# Patient Record
Sex: FEMALE | Race: BLACK OR AFRICAN AMERICAN | NOT HISPANIC OR LATINO
[De-identification: names, ages, dates, MRNs, and addresses within clinical notes are randomized per-mention and may not be internally consistent; named-entity substitution may affect disease eponyms.]

---

## 2017-02-20 ENCOUNTER — TRANSCRIPTION ENCOUNTER (OUTPATIENT)
Age: 42
End: 2017-02-20

## 2017-06-18 ENCOUNTER — RESULT REVIEW (OUTPATIENT)
Age: 42
End: 2017-06-18

## 2018-01-03 ENCOUNTER — TRANSCRIPTION ENCOUNTER (OUTPATIENT)
Age: 43
End: 2018-01-03

## 2018-10-03 ENCOUNTER — EMERGENCY (EMERGENCY)
Facility: HOSPITAL | Age: 43
LOS: 1 days | Discharge: HOME | End: 2018-10-03
Attending: EMERGENCY MEDICINE | Admitting: EMERGENCY MEDICINE
Payer: COMMERCIAL

## 2018-10-03 VITALS
HEART RATE: 87 BPM | OXYGEN SATURATION: 100 % | TEMPERATURE: 98 F | RESPIRATION RATE: 16 BRPM | SYSTOLIC BLOOD PRESSURE: 180 MMHG | DIASTOLIC BLOOD PRESSURE: 115 MMHG

## 2018-10-03 LAB
APPEARANCE UR: SIGNIFICANT CHANGE UP
BACTERIA # UR AUTO: HIGH
BILIRUB UR-MCNC: NEGATIVE — SIGNIFICANT CHANGE UP
BLOOD UR QL VISUAL: HIGH
COLOR SPEC: SIGNIFICANT CHANGE UP
GLUCOSE UR-MCNC: NEGATIVE — SIGNIFICANT CHANGE UP
HYALINE CASTS # UR AUTO: HIGH
KETONES UR-MCNC: NEGATIVE — SIGNIFICANT CHANGE UP
LEUKOCYTE ESTERASE UR-ACNC: SIGNIFICANT CHANGE UP
NITRITE UR-MCNC: POSITIVE — HIGH
PH UR: 6.5 — SIGNIFICANT CHANGE UP (ref 5–8)
PROT UR-MCNC: 20 — SIGNIFICANT CHANGE UP
RBC CASTS # UR COMP ASSIST: HIGH (ref 0–?)
SP GR SPEC: 1.02 — SIGNIFICANT CHANGE UP (ref 1–1.04)
SQUAMOUS # UR AUTO: SIGNIFICANT CHANGE UP
UROBILINOGEN FLD QL: NORMAL — SIGNIFICANT CHANGE UP
WBC UR QL: >50 — HIGH (ref 0–?)

## 2018-10-03 PROCEDURE — 99283 EMERGENCY DEPT VISIT LOW MDM: CPT

## 2018-10-03 RX ORDER — IBUPROFEN 200 MG
800 TABLET ORAL ONCE
Qty: 0 | Refills: 0 | Status: COMPLETED | OUTPATIENT
Start: 2018-10-03 | End: 2018-10-03

## 2018-10-03 RX ORDER — LIDOCAINE 4 G/100G
1 CREAM TOPICAL ONCE
Qty: 0 | Refills: 0 | Status: COMPLETED | OUTPATIENT
Start: 2018-10-03 | End: 2018-10-03

## 2018-10-03 RX ORDER — DIAZEPAM 5 MG
5 TABLET ORAL ONCE
Qty: 0 | Refills: 0 | Status: DISCONTINUED | OUTPATIENT
Start: 2018-10-03 | End: 2018-10-03

## 2018-10-03 RX ORDER — OXYCODONE HYDROCHLORIDE 5 MG/1
1 TABLET ORAL
Qty: 20 | Refills: 0 | OUTPATIENT
Start: 2018-10-03

## 2018-10-03 RX ORDER — DIAZEPAM 5 MG
1 TABLET ORAL
Qty: 12 | Refills: 0 | OUTPATIENT
Start: 2018-10-03

## 2018-10-03 RX ORDER — OXYCODONE AND ACETAMINOPHEN 5; 325 MG/1; MG/1
2 TABLET ORAL ONCE
Qty: 0 | Refills: 0 | Status: DISCONTINUED | OUTPATIENT
Start: 2018-10-03 | End: 2018-10-03

## 2018-10-03 RX ORDER — NITROFURANTOIN MACROCRYSTAL 50 MG
100 CAPSULE ORAL ONCE
Qty: 0 | Refills: 0 | Status: COMPLETED | OUTPATIENT
Start: 2018-10-03 | End: 2018-10-03

## 2018-10-03 RX ORDER — NITROFURANTOIN MACROCRYSTAL 50 MG
1 CAPSULE ORAL
Qty: 14 | Refills: 0 | OUTPATIENT
Start: 2018-10-03 | End: 2018-10-09

## 2018-10-03 RX ADMIN — Medication 100 MILLIGRAM(S): at 20:04

## 2018-10-03 RX ADMIN — Medication 60 MILLIGRAM(S): at 18:50

## 2018-10-03 RX ADMIN — OXYCODONE AND ACETAMINOPHEN 2 TABLET(S): 5; 325 TABLET ORAL at 18:49

## 2018-10-03 RX ADMIN — Medication 800 MILLIGRAM(S): at 18:49

## 2018-10-03 RX ADMIN — LIDOCAINE 1 PATCH: 4 CREAM TOPICAL at 18:49

## 2018-10-03 RX ADMIN — Medication 5 MILLIGRAM(S): at 20:15

## 2018-10-03 NOTE — ED PROVIDER NOTE - MEDICAL DECISION MAKING DETAILS
43 yr old female with known herniated disc coming with worsening LLE pain unrelieve with tramadol.  pt had PT, tried motrin, heat packs and spine md who given steroid injections in past.      worsening sciatica pain without any recent injury or sign of infectious cause.  at this time no concern for epidural abscess hematoma without any recent instrumentation or trauma/constitutional sx, no cocnern for fracture without any acute injury, no concern for cord compression with recent MRi and exam not consistent with cord compression at this time.  will check ua but unlikely cause of pain.

## 2018-10-03 NOTE — ED PROVIDER NOTE - OBJECTIVE STATEMENT
43y F with PMHx HTN, hyperthyroidism, and hernia in L5 and S1 presents c/o left low back pain radiating down left leg x3 days. Also reports or tingling and numbness in legs. Had MRI done 4 weeks ago for chronic persistent back pain. Back pain worsen for 3 days now. No trouble urinating or defecating. Also states having dysuria and urinary urgency. No recent fall. No fever 43y F with PMHx HTN, hyperthyroidism, and hernia in L5 and S1 (from a twisting injury while performing ADL's at home) presents c/o left low back pain radiating down left leg x3 days. Also reports or tingling and numbness in legs. Had MRI done 4 weeks ago for chronic persistent back pain. Back pain worsen for 3 days now without any new exacerbating factors. No trouble urinating or defecating. Also states having dysuria and urinary urgency. No recent fall. No fever

## 2018-10-03 NOTE — ED PROVIDER NOTE - NEUROLOGICAL, MLM
Alert and oriented, no focal deficits, no motor or sensory deficits. + left leg straight leg raise, no saddle anesthesia

## 2018-10-03 NOTE — ED PROVIDER NOTE - PMH
Acne    Herpes Simplex Type 2 Infection    HTN - Hypertension    Hypertension in Pregnancy, Preeclampsia, Delivered    Hypothyroidism

## 2018-10-03 NOTE — ED ADULT TRIAGE NOTE - CHIEF COMPLAINT QUOTE
Pt. c/o left lower back pain radiating down left leg with numbness x 5 days. Worsening today with difficulty sitting due to pain. Took Tramadol at 12:30p with minimal relief. No injury/trauma. PMHx: HTN, hypothyroidism

## 2018-10-03 NOTE — ED PROVIDER NOTE - CONSTITUTIONAL, MLM
normal... Well appearing, well nourished, awake, alert, oriented to person, place, time/situation and in moderate apparent distress from pain.

## 2018-10-05 LAB — SPECIMEN SOURCE: SIGNIFICANT CHANGE UP

## 2018-10-06 LAB
-  AMIKACIN: SIGNIFICANT CHANGE UP
-  AMPICILLIN/SULBACTAM: SIGNIFICANT CHANGE UP
-  AMPICILLIN: SIGNIFICANT CHANGE UP
-  AZTREONAM: SIGNIFICANT CHANGE UP
-  CEFAZOLIN: SIGNIFICANT CHANGE UP
-  CEFEPIME: SIGNIFICANT CHANGE UP
-  CEFOXITIN: SIGNIFICANT CHANGE UP
-  CEFTAZIDIME: SIGNIFICANT CHANGE UP
-  CEFTRIAXONE: SIGNIFICANT CHANGE UP
-  CIPROFLOXACIN: SIGNIFICANT CHANGE UP
-  ERTAPENEM: SIGNIFICANT CHANGE UP
-  GENTAMICIN: SIGNIFICANT CHANGE UP
-  IMIPENEM: SIGNIFICANT CHANGE UP
-  LEVOFLOXACIN: SIGNIFICANT CHANGE UP
-  MEROPENEM: SIGNIFICANT CHANGE UP
-  NITROFURANTOIN: SIGNIFICANT CHANGE UP
-  PIPERACILLIN/TAZOBACTAM: SIGNIFICANT CHANGE UP
-  TIGECYCLINE: SIGNIFICANT CHANGE UP
-  TOBRAMYCIN: SIGNIFICANT CHANGE UP
-  TRIMETHOPRIM/SULFAMETHOXAZOLE: SIGNIFICANT CHANGE UP
BACTERIA UR CULT: SIGNIFICANT CHANGE UP
METHOD TYPE: SIGNIFICANT CHANGE UP
ORGANISM # SPEC MICROSCOPIC CNT: SIGNIFICANT CHANGE UP
ORGANISM # SPEC MICROSCOPIC CNT: SIGNIFICANT CHANGE UP

## 2018-11-02 ENCOUNTER — OUTPATIENT (OUTPATIENT)
Dept: OUTPATIENT SERVICES | Facility: HOSPITAL | Age: 43
LOS: 1 days | End: 2018-11-02
Payer: COMMERCIAL

## 2018-11-02 ENCOUNTER — APPOINTMENT (OUTPATIENT)
Dept: ANESTHESIOLOGY | Facility: CLINIC | Age: 43
End: 2018-11-02

## 2018-11-02 DIAGNOSIS — M54.16 RADICULOPATHY, LUMBAR REGION: ICD-10-CM

## 2018-11-02 PROCEDURE — 64483 NJX AA&/STRD TFRM EPI L/S 1: CPT

## 2018-11-02 PROCEDURE — 64484 NJX AA&/STRD TFRM EPI L/S EA: CPT

## 2018-11-05 DIAGNOSIS — M54.17 RADICULOPATHY, LUMBOSACRAL REGION: ICD-10-CM

## 2018-12-09 ENCOUNTER — TRANSCRIPTION ENCOUNTER (OUTPATIENT)
Age: 43
End: 2018-12-09

## 2019-01-03 ENCOUNTER — APPOINTMENT (OUTPATIENT)
Dept: UROLOGY | Facility: CLINIC | Age: 44
End: 2019-01-03
Payer: COMMERCIAL

## 2019-01-03 VITALS
DIASTOLIC BLOOD PRESSURE: 80 MMHG | BODY MASS INDEX: 31.28 KG/M2 | RESPIRATION RATE: 14 BRPM | HEIGHT: 62 IN | SYSTOLIC BLOOD PRESSURE: 120 MMHG | HEART RATE: 102 BPM | TEMPERATURE: 97.8 F | WEIGHT: 170 LBS | OXYGEN SATURATION: 98 %

## 2019-01-03 PROCEDURE — 99213 OFFICE O/P EST LOW 20 MIN: CPT

## 2019-01-06 ENCOUNTER — FORM ENCOUNTER (OUTPATIENT)
Age: 44
End: 2019-01-06

## 2019-01-07 ENCOUNTER — OUTPATIENT (OUTPATIENT)
Dept: OUTPATIENT SERVICES | Facility: HOSPITAL | Age: 44
LOS: 1 days | End: 2019-01-07
Payer: COMMERCIAL

## 2019-01-07 ENCOUNTER — APPOINTMENT (OUTPATIENT)
Dept: ULTRASOUND IMAGING | Facility: CLINIC | Age: 44
End: 2019-01-07
Payer: COMMERCIAL

## 2019-01-07 DIAGNOSIS — R39.0 EXTRAVASATION OF URINE: ICD-10-CM

## 2019-01-07 DIAGNOSIS — N39.0 URINARY TRACT INFECTION, SITE NOT SPECIFIED: ICD-10-CM

## 2019-01-07 LAB — BACTERIA UR CULT: ABNORMAL

## 2019-01-07 PROCEDURE — 76770 US EXAM ABDO BACK WALL COMP: CPT

## 2019-01-07 PROCEDURE — 76770 US EXAM ABDO BACK WALL COMP: CPT | Mod: 26

## 2019-01-13 LAB — BACTERIA UR CULT: NORMAL

## 2019-01-15 ENCOUNTER — MESSAGE (OUTPATIENT)
Age: 44
End: 2019-01-15

## 2019-02-15 ENCOUNTER — APPOINTMENT (OUTPATIENT)
Dept: ANESTHESIOLOGY | Facility: CLINIC | Age: 44
End: 2019-02-15

## 2019-02-15 ENCOUNTER — OUTPATIENT (OUTPATIENT)
Dept: OUTPATIENT SERVICES | Facility: HOSPITAL | Age: 44
LOS: 1 days | End: 2019-02-15
Payer: COMMERCIAL

## 2019-02-15 DIAGNOSIS — M54.17 RADICULOPATHY, LUMBOSACRAL REGION: ICD-10-CM

## 2019-02-15 DIAGNOSIS — M54.16 RADICULOPATHY, LUMBAR REGION: ICD-10-CM

## 2019-02-15 PROCEDURE — 64483 NJX AA&/STRD TFRM EPI L/S 1: CPT

## 2019-02-15 PROCEDURE — 64484 NJX AA&/STRD TFRM EPI L/S EA: CPT

## 2019-02-20 ENCOUNTER — EMERGENCY (EMERGENCY)
Facility: HOSPITAL | Age: 44
LOS: 1 days | Discharge: HOME | End: 2019-02-20
Attending: EMERGENCY MEDICINE
Payer: COMMERCIAL

## 2019-02-20 VITALS
TEMPERATURE: 98 F | RESPIRATION RATE: 16 BRPM | SYSTOLIC BLOOD PRESSURE: 150 MMHG | WEIGHT: 173.06 LBS | HEART RATE: 75 BPM | DIASTOLIC BLOOD PRESSURE: 83 MMHG | OXYGEN SATURATION: 100 %

## 2019-02-20 VITALS
HEART RATE: 65 BPM | DIASTOLIC BLOOD PRESSURE: 86 MMHG | OXYGEN SATURATION: 100 % | RESPIRATION RATE: 18 BRPM | TEMPERATURE: 99 F | SYSTOLIC BLOOD PRESSURE: 117 MMHG

## 2019-02-20 PROCEDURE — 99283 EMERGENCY DEPT VISIT LOW MDM: CPT

## 2019-02-20 PROCEDURE — 99284 EMERGENCY DEPT VISIT MOD MDM: CPT | Mod: 25

## 2019-02-20 PROCEDURE — 96372 THER/PROPH/DIAG INJ SC/IM: CPT

## 2019-02-20 RX ORDER — OXYCODONE HYDROCHLORIDE 5 MG/1
1 TABLET ORAL
Qty: 12 | Refills: 0 | OUTPATIENT
Start: 2019-02-20 | End: 2019-02-22

## 2019-02-20 RX ORDER — KETOROLAC TROMETHAMINE 30 MG/ML
30 SYRINGE (ML) INJECTION ONCE
Qty: 0 | Refills: 0 | Status: DISCONTINUED | OUTPATIENT
Start: 2019-02-20 | End: 2019-02-20

## 2019-02-20 RX ORDER — OXYCODONE HYDROCHLORIDE 5 MG/1
5 TABLET ORAL ONCE
Qty: 0 | Refills: 0 | Status: DISCONTINUED | OUTPATIENT
Start: 2019-02-20 | End: 2019-02-20

## 2019-02-20 RX ORDER — LIDOCAINE 4 G/100G
1 CREAM TOPICAL ONCE
Qty: 0 | Refills: 0 | Status: COMPLETED | OUTPATIENT
Start: 2019-02-20 | End: 2019-02-20

## 2019-02-20 RX ORDER — KETOROLAC TROMETHAMINE 30 MG/ML
15 SYRINGE (ML) INJECTION ONCE
Qty: 0 | Refills: 0 | Status: DISCONTINUED | OUTPATIENT
Start: 2019-02-20 | End: 2019-02-20

## 2019-02-20 RX ORDER — ACETAMINOPHEN 500 MG
975 TABLET ORAL ONCE
Qty: 0 | Refills: 0 | Status: COMPLETED | OUTPATIENT
Start: 2019-02-20 | End: 2019-02-20

## 2019-02-20 RX ADMIN — LIDOCAINE 1 PATCH: 4 CREAM TOPICAL at 09:48

## 2019-02-20 RX ADMIN — OXYCODONE HYDROCHLORIDE 5 MILLIGRAM(S): 5 TABLET ORAL at 09:48

## 2019-02-20 RX ADMIN — Medication 30 MILLIGRAM(S): at 12:35

## 2019-02-20 RX ADMIN — OXYCODONE HYDROCHLORIDE 5 MILLIGRAM(S): 5 TABLET ORAL at 10:20

## 2019-02-20 RX ADMIN — Medication 975 MILLIGRAM(S): at 09:48

## 2019-02-20 RX ADMIN — Medication 975 MILLIGRAM(S): at 10:20

## 2019-02-20 RX ADMIN — Medication 30 MILLIGRAM(S): at 11:59

## 2019-02-20 NOTE — ED PROVIDER NOTE - CARE PLAN
Principal Discharge DX:	Acute right-sided low back pain with right-sided sciatica  Assessment and plan of treatment:	1. Follow with your PMD within 1-2 days. Additionally please follow up with your pain management doctor in 2-3 days.  2. Rest, no heavy lifting.  Warm compresses to area. Recommend Ortho consult to discuss possible MRI vs Physical Therapy- referral list provided.  Light walking. Take Tylenol 650mg every 4-6 hours as needed for mild pain. Additionally take Motrin 600 mg every 8 hours with food for pain. For severe breakthru pain Take Oxycodone 5mg every 6 hours as needed. CAUTION: Do not drive or consume alcohol while taking oxycodone as may cause drowsiness/dizziness.   3. Apply 1 over the counter Lidoderm (Salonpas) patch to the affected area once a day for additional symptom relief. Keep 1 patch on for up to 12 hours at a time but remove after use, do not use more than 1 patch in a 24 hour period.  4. If you develop any worsening pain, weakness, numbness/tingling, bowel or urinary incontinence, groin numbness, fever/chills or any other concerning symptoms please return to ER immediately.

## 2019-02-20 NOTE — ED PROVIDER NOTE - ATTENDING CONTRIBUTION TO CARE
43f employee PMH HTN,Hypothryodism, lbp sp epidural injection 2/15 with mod improvement. Two days later pt states she "ate something bad' with nausea and vomiting, that has since totally resolved. But episode excerbated rt back pain rad to rt leg, No fever chills, cough,ha incontinence. Abd  pain. PE WDWN female awake alert normocephalic atraumatic chest clear anterior & posterior abd soft +bs no mass guarding. Neruo slr rt at 30 degrees. distal sensation power intact  Jas Carpenter MD, Facep

## 2019-02-20 NOTE — ED PROVIDER NOTE - OBJECTIVE STATEMENT
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 Went to her pain management physician on 2/15 a few days after symptom onset and had epidural injection w/ mild improvement of pain. 2 days later patient had nausea and vomiting after eating "bad food" and while vomiting felt like she was hunched over and felt the same right lower back/buttock pain again which has persisted. Pain is felt mainly R buttock and radiates down posterior/lateral aspect of leg with intermittent numbness to R foot. Has been taking Motrin 800 mg every 4 hrs w/o relief. This AM at the end of the work shift she was standing up from going to the bathroom and felt pain get worse so came to ED. She is ambulatory although endorses pain down right leg. Denies fever/chills, swelling to back, saddle anesthesia, bowel/bladder incontinence, dizziness, weakness, abdominal pain, n/v/d, cp, sob, LOC, direct trauma/fall, rash.

## 2019-02-20 NOTE — ED ADULT NURSE REASSESSMENT NOTE - NS ED NURSE REASSESS COMMENT FT1
Pt reports decrease in pain following tylenol, oxycodone and lidoderm administration. Nahid LAINEZ notified. Nahid LAINEZ states he will reassess.

## 2019-02-20 NOTE — ED PROVIDER NOTE - CLINICAL SUMMARY MEDICAL DECISION MAKING FREE TEXT BOX
Adult female with lbp,htn,hypothyrodism, had exacerbation of pain after nausea and vomiting. No fever and chills.incontinence, rad to rt leg. Pain cw sciaticia, Treated for same and improved. DC for opt follow up.

## 2019-02-20 NOTE — ED ADULT NURSE NOTE - NSIMPLEMENTINTERV_GEN_ALL_ED
Implemented All Fall Risk Interventions:  [place] to call system. Call bell, personal items and telephone within reach. Instruct patient to call for assistance. Room bathroom lighting operational. Non-slip footwear when patient is off stretcher. Physically safe environment: no spills, clutter or unnecessary equipment. Stretcher in lowest position, wheels locked, appropriate side rails in place. Provide visual cue, wrist band, yellow gown, etc. Monitor gait and stability. Monitor for mental status changes and reorient to person, place, and time. Review medications for side effects contributing to fall risk. Reinforce activity limits and safety measures with patient and family.

## 2019-02-20 NOTE — ED PROVIDER NOTE - PROGRESS NOTE DETAILS
Patient felt improvement of pain s/p oxy, tylenol, lido patch from 9/10 to 5/10. Able to sit up on stretcher and lightly bear weight which she was unable to do before. Attempted to walk but still w/ pain radiating down R leg which limits ambulation. D/w attending, will give toradol and reassess. - Nahid Starr PA-C Pt feels much improved s/p toradol. Pain now 3/10, able to ambulate at bedside which was improved from previous. Given significant improvement of pain pt wishes to go home, feels well. Pt without any redflag back pain sxs. no midline back pain. Neurological exam intact. D/w attending who agrees and cleared pt for discharge. Will send home short course of oxycodone for breakthru pain, advise tylenol/motrin for mild pain, lidoderm patch, and strict return precautions including red flag symptoms. Pt is stable for discharge. Has ride home with . - Nahid Starr PA-C Phone Followup, Pt much improved overnight. This am after "walking around pain gradually worsened' Has planned follow up with ortho tomorrow and is scheduleing return visit to pain. management. No fever chills, bowel or bladder changes.  Jas Carpenter MD, Facep

## 2019-02-20 NOTE — ED PROVIDER NOTE - EXTREMITY EXAM
No obvious deformity of UE/LE bilaterally. No bony hip tenderness. Pelvis stable. Full AROM with 5/5 UE/LE bilaterally. Sensation intact all extremities./no deformity, pain or tenderness, no restriction of movement

## 2019-02-20 NOTE — ED PROVIDER NOTE - NS CPE EDP MUSC THORACIC LOC
No deformity. No stepoffs. No rash. No midline thoracic tenderness. No paraspinal tenderness. Full AROM.

## 2019-02-20 NOTE — ED PROVIDER NOTE - NS CPE EDP MUSC LUMBAR LOC
No deformity, bruising, rash. No swelling/bruising/erythema around epidural site. No stepoffs. No midline lumbar ttp. +right sided lumbar paraspinal tenderness and +buttock ttp over piriformis on right side. Limited ROM 2/2 pain

## 2019-02-20 NOTE — ED PROVIDER NOTE - NONTENDER LOCATION
impairments found/therapy frequency/rehab potential/anticipated equipment needs at discharge/anticipated discharge recommendation/predicted duration of therapy intervention
Abdomen soft, nontender/nondistended all quadrants with no rebound, guarding or rigidity noted.

## 2019-02-20 NOTE — ED ADULT NURSE NOTE - OBJECTIVE STATEMENT
43y female coming in from [employer] work c/o back pain. A&Ox3 and VSS. Hx of HTN, hypothyroidism and lumbar/sacral herniated disc. Presents to ED c/o lower back pain. Pt reports she received an epidural on Friday for the back pain. Pt reports recent GI upset causing vomiting. Movement exacerbated back pain. Pt went to work last night and states the pain was unbearable. Pt took aleve at 1am without relief.  Pt now states pain 8/10 at rest, 10/10 upon movement. Pain radiates down right leg and numbness/tingling present in right foot. Denies chest pain, sob, fever/chills. Lungs clear. Abdomen soft, nontender.

## 2019-02-20 NOTE — ED PROVIDER NOTE - NSFOLLOWUPCLINICS_GEN_ALL_ED_FT
[facility]  Orthopedic Surgery  [address] [address]  Phone: [phone]  Fax:   Follow Up Time: 1-3 Days

## 2019-02-20 NOTE — ED PROVIDER NOTE - NSFOLLOWUPINSTRUCTIONS_ED_ALL_ED_FT
1. Follow with your PMD within 1-2 days. Additionally please follow up with your pain management doctor in 2-3 days.  2. Rest, no heavy lifting.  Warm compresses to area. Recommend Ortho consult to discuss possible MRI vs Physical Therapy- referral list provided.  Light walking. Take Tylenol 650mg every 4-6 hours as needed for mild pain. Additionally take Motrin 600 mg every 8 hours with food for pain. For severe breakthru pain Take Oxycodone 5mg every 6 hours as needed. CAUTION: Do not drive or consume alcohol while taking oxycodone as may cause drowsiness/dizziness.   3. Apply 1 over the counter Lidoderm (Salonpas) patch to the affected area once a day for additional symptom relief. Keep 1 patch on for up to 12 hours at a time but remove after use, do not use more than 1 patch in a 24 hour period.  4. If you develop any worsening pain, weakness, numbness/tingling, bowel or urinary incontinence, groin numbness, fever/chills or any other concerning symptoms please return to ER immediately.

## 2019-02-20 NOTE — ED ADULT NURSE NOTE - CAS DISCH TRANSFER METHOD
Problem: Patient Care Overview  Goal: Plan of Care/Patient Progress Review  Outcome: No Change  Baby doing very well. Full assessment and VS WDL. Breastfeeding on cue with excellent latch. Voiding and stooling. Content between feedings.        Private car

## 2019-02-20 NOTE — ED ADULT NURSE REASSESSMENT NOTE - NS ED NURSE REASSESS COMMENT FT1
Pt reports significant improvement in pain. Discharged home to follow up with PMD, pain management and orthopedics. Prescribed oxycodone and instructed to take lidocaine patches and tylenol/motrin for pain. VSS. Ambulate with steady gait.

## 2019-02-22 ENCOUNTER — APPOINTMENT (OUTPATIENT)
Dept: ORTHOPEDIC SURGERY | Facility: CLINIC | Age: 44
End: 2019-02-22
Payer: COMMERCIAL

## 2019-02-22 VITALS
HEIGHT: 62 IN | WEIGHT: 173 LBS | SYSTOLIC BLOOD PRESSURE: 132 MMHG | BODY MASS INDEX: 31.83 KG/M2 | HEART RATE: 91 BPM | DIASTOLIC BLOOD PRESSURE: 83 MMHG

## 2019-02-22 DIAGNOSIS — M54.16 RADICULOPATHY, LUMBAR REGION: ICD-10-CM

## 2019-02-22 PROCEDURE — 99204 OFFICE O/P NEW MOD 45 MIN: CPT

## 2019-02-25 NOTE — REASON FOR VISIT
[Initial Visit] : an initial visit for [Degenerative Joint Disease] : degenerative joint disease [Back Pain] : back pain [Radiculopathy] : radiculopathy [FreeTextEntry2] : Lower back pain with radiation to the right buttock and lower extremity

## 2019-02-25 NOTE — HISTORY OF PRESENT ILLNESS
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 In November she underwent an epidural steroid injection at which point she had left leg pain. Currently she has right buttock and anterolateral thigh pain to the pretibial area that she grades as a 10 and lower back pain she grades as a 9. She has had some numbness in the right foot but denies paresthesias. She has had night pain. The pain is worse with coughing, sneezing and forcing to move her bowels. The symptoms are worse with standing and walking more so than sitting. She had another epidural on February 15. She had a recent GI virus with nausea and vomiting that aggravated the pain.

## 2019-02-25 NOTE — PHYSICAL EXAM
[de-identified] : She is fully alert and oriented with a normal mood and affect. She is in no acute distress. She ambulates with a normal gait including tiptoe and heel walking. There are no cutaneous abnormalities were palpable bony defects of the spine. There is no evidence of shortness of breath or respiratory distress. There is no paravertebral muscle spasm or trochanteric tenderness. There is some mild right-sided sciatic notch tenderness but no the left. Lower extremity neurological examination revealed 1+ symmetrical reflexes with normal motor power and sensation. She leg raising in the sitting position at 90° aggravated the right-sided back and leg pain. There no cutaneous abnormalities of the upper lower extremities. Her knees have full range of motion with normal stability. Vascular exam shows no evidence of dark ostomies and there is no lymphedema. Her upper extremities are normal to inspection and a Alvarez have a full range of motion with normal motor power and stability. [de-identified] : She has had an MRI of the lumbar spine. Neither the report or the films are available for review today. A fluoroscopic image from her epidurals reveals evidence of a narrowed disc space at the L4-5 level.

## 2019-02-25 NOTE — DISCUSSION/SUMMARY
[Medication Risks Reviewed] : Medication risks reviewed [de-identified] : She is to start on diclofenac 75 mg twice a day as nonsteroidal anti-inflammatory and I will see her for followup in 3 weeks. She will call as there are problems with the medication or worsening of her symptoms.I have asked her to return with a copy of her MRI for my review at the time of her next visit.

## 2019-03-04 ENCOUNTER — APPOINTMENT (OUTPATIENT)
Dept: ORTHOPEDIC SURGERY | Facility: CLINIC | Age: 44
End: 2019-03-04
Payer: COMMERCIAL

## 2019-03-04 VITALS
HEART RATE: 84 BPM | DIASTOLIC BLOOD PRESSURE: 87 MMHG | BODY MASS INDEX: 31.83 KG/M2 | SYSTOLIC BLOOD PRESSURE: 136 MMHG | HEIGHT: 62 IN | WEIGHT: 173 LBS

## 2019-03-04 PROCEDURE — 72100 X-RAY EXAM L-S SPINE 2/3 VWS: CPT

## 2019-03-04 PROCEDURE — 99215 OFFICE O/P EST HI 40 MIN: CPT

## 2019-03-04 NOTE — CONSULT LETTER
[Dear  ___] : Dear  [unfilled], [Consult Letter:] : I had the pleasure of evaluating your patient, [unfilled]. [Please see my note below.] : Please see my note below. [Consult Closing:] : Thank you very much for allowing me to participate in the care of this patient.  If you have any questions, please do not hesitate to contact me. [Sincerely,] : Sincerely, [FreeTextEntry3] : Ezekiel Fields MD

## 2019-03-04 NOTE — HISTORY OF PRESENT ILLNESS
[Prolonged Standing] : worsened by prolonged standing [Walking] : worsened by walking [NSAIDs] : relieved by nonsteroidal anti-inflammatory drugs [Opioid Analgesics] : relieved by opioid analgesics [de-identified] : Pt presents with c/o left sided low back pain since 10/2018, treated with MARRY and prednisone taper, this provided improvement in pain. Presents today with c/o right sided low back pain since 11/2018, pain radiates to right buttocks and anterior/lateral aspect of RLE to ankle associated with numbness/tingling in the top of right foot and right ankle, She denies tingling to B/L LE. and RLE weakness.  Pt had LESI X2 on 02/15/2019, this provided some relief in pain for 2 days, but  had an episode of N/V that caused exacerbation of her pain, she is scheduled to have another LESI on 03/12/19. Pt F/u with Dr Montiel. Pt takes Percocet 5mg BID and Voltaren, these provides mild relief in pain, , Pt went [facility] on [facility]/21/[facility] due to severe pain. Pt does not participate with PT at this time [Incontinence] : no incontinence [Urinary Ret.] : no urinary retention [de-identified] :  numbness in the right foot exacerbates pain

## 2019-03-04 NOTE — PHYSICAL EXAM
[ALL] : dorsalis pedis, posterior tibial, femoral, popliteal, and radial 2+ and symmetric bilaterally [Normal] : Oriented to person, place, and time, insight and judgement were intact and the affect was normal [] : Motor: [UE Motor Strength NL] : Motor strength of the bilateral upper extremities is normal [NL] : Motor strength of the left lower extremity was normal [Motor Strength Lower Extremities] : right (weak) [UE/LE] : Sensory: Intact in bilateral upper & lower extremities [1+] : right patella 1+ [2+] : left patella 2+ [Antalgic] : antalgic [Vásquez's Sign] : negative Vásquez's sign [Pronator Drift] : negative pronator drift [SLR] : negative straight leg raise [de-identified] : ROM Lumbar spine: limited painful\par TTP right paraspinals L region. \par NTTP L spine\par Skin intact L spine. No rashes, ulcers, blisters. \par No lymphedema. \par Rapid alternating movements- Intact. \par Full and non-painful ROM RUE, LUE, RLE, and LLE. Skin intact RUE, LUE, RLE, and LLE. No rashes, blisters, ulcers. NTTP RUE, LUE, RLE, and LLE. No evidence of dislocation or subluxation B/L.\par  [de-identified] : 3 views AP and flex/ext of LS Spine from [id number] 03/04/19. Read and dictated by Dr. Ezekiel Fields Board Certified orthopaedic surgeon , DDD L45 \par \par MRI 9/2018 - L45 DDD, Right paracentral disc herniation\par \par \par \par

## 2019-03-04 NOTE — DISCUSSION/SUMMARY
[de-identified] : 44 yo female with L45 DDD/HNP, next appointment consider surgery.  Updated lumbar MRI.

## 2019-03-05 ENCOUNTER — FORM ENCOUNTER (OUTPATIENT)
Age: 44
End: 2019-03-05

## 2019-03-06 ENCOUNTER — OUTPATIENT (OUTPATIENT)
Dept: OUTPATIENT SERVICES | Facility: HOSPITAL | Age: 44
LOS: 1 days | End: 2019-03-06
Payer: COMMERCIAL

## 2019-03-06 ENCOUNTER — APPOINTMENT (OUTPATIENT)
Dept: MRI IMAGING | Facility: CLINIC | Age: 44
End: 2019-03-06
Payer: COMMERCIAL

## 2019-03-06 DIAGNOSIS — M51.36 OTHER INTERVERTEBRAL DISC DEGENERATION, LUMBAR REGION: ICD-10-CM

## 2019-03-06 PROCEDURE — 72148 MRI LUMBAR SPINE W/O DYE: CPT

## 2019-03-06 PROCEDURE — 72148 MRI LUMBAR SPINE W/O DYE: CPT | Mod: 26

## 2019-03-11 ENCOUNTER — OTHER (OUTPATIENT)
Age: 44
End: 2019-03-11

## 2019-03-11 ENCOUNTER — MOBILE ON CALL (OUTPATIENT)
Age: 44
End: 2019-03-11

## 2019-03-12 ENCOUNTER — APPOINTMENT (OUTPATIENT)
Dept: ANESTHESIOLOGY | Facility: CLINIC | Age: 44
End: 2019-03-12

## 2019-03-15 ENCOUNTER — APPOINTMENT (OUTPATIENT)
Dept: ORTHOPEDIC SURGERY | Facility: CLINIC | Age: 44
End: 2019-03-15

## 2019-03-25 ENCOUNTER — APPOINTMENT (OUTPATIENT)
Dept: ORTHOPEDIC SURGERY | Facility: CLINIC | Age: 44
End: 2019-03-25
Payer: COMMERCIAL

## 2019-03-25 PROCEDURE — 99215 OFFICE O/P EST HI 40 MIN: CPT

## 2019-03-25 NOTE — DISCUSSION/SUMMARY
[de-identified] : 42 yo female with L45 DDD/HNP progressively worsening and migrating to L34 level, failed PT, SNAIDS, and MARRY, recommend Right L3-L5 laminectomy and discectomy. \par \par I reviewed all major risks, benefits, and complications associated with lumbar laminectomy and/or microdiscectomy including but not limited to bleeding, infection, CSF leak, neurological injury, chronic pain, reherniation, hematoma worsening of pain, anesthetic risk, chronic pain and disability, need for further surgical intervention, medical complications (GI, , DVT, PE, cardiac, pulmonary, etc) and risk of mortality.\par \par

## 2019-03-25 NOTE — PHYSICAL EXAM
[Antalgic] : antalgic [UE Motor Strength NL] : Motor strength of the bilateral upper extremities is normal [NL] : Motor strength of the left lower extremity was normal [Motor Strength Lower Extremities] : right (weak) [1+] : right patella 1+ [2+] : left patella 2+ [ALL] : dorsalis pedis, posterior tibial, femoral, popliteal, and radial 2+ and symmetric bilaterally [Normal] : Oriented to person, place, and time, insight and judgement were intact and the affect was normal [] : Sensory: [L4-RT] : L4 [L5-RT] : L5 [Vásquez's Sign] : negative Vásquez's sign [Pronator Drift] : negative pronator drift [SLR] : negative straight leg raise [de-identified] : ROM Lumbar spine: limited painful\par TTP right paraspinals L region. \par NTTP L spine\par Skin intact L spine. No rashes, ulcers, blisters. \par No lymphedema. \par \par  [de-identified] : EXAM: MR SPINE LUMBAR \par \par \par PROCEDURE DATE: 03/06/2019 \par \par \par \par INTERPRETATION: \par LUMBOSACRAL SPINE MRI \par \par CLINICAL INFORMATION: Low back and right leg pain. \par TECHNIQUE: Multiplanar, multisequence MR imaging was obtained of the \par lumbosacral spine. \par FINDINGS: \par \par DISC LEVEL EVALUATION: \par \par L1/L2: Normal \par L2/L3: Normal \par L3/L4: Normal \par L4/L5: At the level of the disc space there is a moderate sized central and \par right paracentral disc protrusion. In addition extending superiorly from \par this region there is a large fragment of extruded disc material that extends \par craniad in the right paracentral region to nearly the level of the L3-L4 \par disc space. This causes moderate to severe central canal stenosis and severe \par right paracentral stenosis. At the level of the disc space there is moderate \par to severe central canal stenosis and severe right paracentral stenosis. \par These findings likely result in impingement of the right L4 and L5 nerve \par roots. \par L5/S1: There is a small to moderate central disc protrusion that is slightly \par asymmetric to the left and appears to contact the descending left S1 nerve \par root causing mild central canal stenosis. \par \par SPINAL ALIGNMENT: Normal \par DISTAL CORD AND CONUS: The distal cord is normal in appearance. The conus \par terminates at L1 and is unremarkable. \par SI JOINTS: Normal \par MARROW: Marked fatty endplate changes present at L4-L5. \par PARASPINAL MUSCLE AND SOFT TISSUES: Normal \par INTRAABDOMINAL/INTRAPELVIC SOFT TISSUES: Normal \par \par IMPRESSION: Moderate sized central and right paracentral disc protrusion at \par L4-L5 with a superimposed large fragment of extruded disc material that \par extends craniad from the level of the disc space. These findings result in \par central canal stenosis and severe right paracentral stenosis as detailed \par above with likely impingement of the right L4 and L5 nerve roots. \par \par Small to moderate central disc protrusion at L5-S1 that is asymmetric to the \par left and appears to contact the descending left S1 nerve root. \par \par \par LEYDI LUO M.D., ATTENDING RADIOLOGIST Phone #: [phone] \par This document has been electronically signed. Mar 8 2019 12:14PM \par \par 3 views AP and flex/ext of LS Spine from [id number] 03/04/19. Read and dictated by Dr. Ezekiel Fields Board Certified orthopaedic surgeon , DDD L45 \par \par MRI 9/2018 - L45 DDD, Right paracentral disc herniation\par \par \par \par

## 2019-03-25 NOTE — HISTORY OF PRESENT ILLNESS
[Prolonged Standing] : worsened by prolonged standing [Walking] : worsened by walking [NSAIDs] : relieved by nonsteroidal anti-inflammatory drugs [Opioid Analgesics] : relieved by opioid analgesics [de-identified] : Pt with L45 DDD/HNP, failed PT, MARRY, Presents today for f/u results of lumbar spine MRI. pain radiates to right buttocks and anterior/lateral aspect of RLE to ankle associated with numbness/tingling in the top of right foot and right ankle, She denies tingling to B/L LE. and RLE weakness.  Pt had LESI X2 on 02/15/2019, this provided some relief in pain for 2 days, but  had an episode of N/V that caused exacerbation of her pain. Pt F/u with Dr Montiel. Pt takes Percocet 5mg BID and Voltaren, these provides mild relief in pain, [Incontinence] : no incontinence [Urinary Ret.] : no urinary retention [de-identified] :  numbness in the right foot exacerbates pain

## 2019-04-26 ENCOUNTER — OUTPATIENT (OUTPATIENT)
Dept: OUTPATIENT SERVICES | Facility: HOSPITAL | Age: 44
LOS: 1 days | End: 2019-04-26
Payer: COMMERCIAL

## 2019-04-26 VITALS
TEMPERATURE: 98 F | WEIGHT: 171.96 LBS | HEART RATE: 86 BPM | RESPIRATION RATE: 16 BRPM | OXYGEN SATURATION: 99 % | SYSTOLIC BLOOD PRESSURE: 137 MMHG | HEIGHT: 62 IN | DIASTOLIC BLOOD PRESSURE: 89 MMHG

## 2019-04-26 DIAGNOSIS — Z01.818 ENCOUNTER FOR OTHER PREPROCEDURAL EXAMINATION: ICD-10-CM

## 2019-04-26 DIAGNOSIS — Z29.9 ENCOUNTER FOR PROPHYLACTIC MEASURES, UNSPECIFIED: ICD-10-CM

## 2019-04-26 DIAGNOSIS — M48.07 SPINAL STENOSIS, LUMBOSACRAL REGION: ICD-10-CM

## 2019-04-26 LAB
ANION GAP SERPL CALC-SCNC: 12 MMOL/L — SIGNIFICANT CHANGE UP (ref 5–17)
BUN SERPL-MCNC: 12 MG/DL — SIGNIFICANT CHANGE UP (ref 7–23)
CALCIUM SERPL-MCNC: 9.3 MG/DL — SIGNIFICANT CHANGE UP (ref 8.4–10.5)
CHLORIDE SERPL-SCNC: 104 MMOL/L — SIGNIFICANT CHANGE UP (ref 96–108)
CO2 SERPL-SCNC: 27 MMOL/L — SIGNIFICANT CHANGE UP (ref 22–31)
CREAT SERPL-MCNC: 0.76 MG/DL — SIGNIFICANT CHANGE UP (ref 0.5–1.3)
GLUCOSE SERPL-MCNC: 125 MG/DL — HIGH (ref 70–99)
HCT VFR BLD CALC: 28.6 % — LOW (ref 34.5–45)
HGB BLD-MCNC: 8 G/DL — LOW (ref 11.5–15.5)
MCHC RBC-ENTMCNC: 19.7 PG — LOW (ref 27–34)
MCHC RBC-ENTMCNC: 28 GM/DL — LOW (ref 32–36)
MCV RBC AUTO: 70.3 FL — LOW (ref 80–100)
MRSA PCR RESULT.: SIGNIFICANT CHANGE UP
PLATELET # BLD AUTO: 277 K/UL — SIGNIFICANT CHANGE UP (ref 150–400)
POTASSIUM SERPL-MCNC: 3.2 MMOL/L — LOW (ref 3.5–5.3)
POTASSIUM SERPL-SCNC: 3.2 MMOL/L — LOW (ref 3.5–5.3)
RBC # BLD: 4.07 M/UL — SIGNIFICANT CHANGE UP (ref 3.8–5.2)
RBC # FLD: 18.2 % — HIGH (ref 10.3–14.5)
S AUREUS DNA NOSE QL NAA+PROBE: SIGNIFICANT CHANGE UP
SODIUM SERPL-SCNC: 143 MMOL/L — SIGNIFICANT CHANGE UP (ref 135–145)
WBC # BLD: 6.7 K/UL — SIGNIFICANT CHANGE UP (ref 3.8–10.5)
WBC # FLD AUTO: 6.7 K/UL — SIGNIFICANT CHANGE UP (ref 3.8–10.5)

## 2019-04-26 PROCEDURE — 85027 COMPLETE CBC AUTOMATED: CPT

## 2019-04-26 PROCEDURE — 87641 MR-STAPH DNA AMP PROBE: CPT

## 2019-04-26 PROCEDURE — 87640 STAPH A DNA AMP PROBE: CPT

## 2019-04-26 PROCEDURE — G0463: CPT

## 2019-04-26 PROCEDURE — 80048 BASIC METABOLIC PNL TOTAL CA: CPT

## 2019-04-26 NOTE — H&P PST ADULT - ASSESSMENT
TOMMYI VTE 2.0 SCORE [CLOT updated 2019]    AGE RELATED RISK FACTORS                                                       MOBILITY RELATED FACTORS  [x ] Age 41-60 years                                            (1 Point)                    [ ] Bed rest                                                        (1 Point)  [ ] Age: 61-74 years                                           (2 Points)                  [ ] Plaster cast                                                   (2 Points)  [ ] Age= 75 years                                              (3 Points)                    [ ] Bed bound for more than 72 hours                 (2 Points)    DISEASE RELATED RISK FACTORS                                               GENDER SPECIFIC FACTORS  [ ] Edema in the lower extremities                       (1 Point)              [ ] Pregnancy                                                     (1 Point)  [ ] Varicose veins                                               (1 Point)                     [ ] Post-partum < 6 weeks                                   (1 Point)             [x ] BMI > 25 Kg/m2                                            (1 Point)                     [ ] Hormonal therapy  or oral contraception          (1 Point)                 [ ] Sepsis (in the previous month)                        (1 Point)               [ ] History of pregnancy complications                 (1 point)  [ ] Pneumonia or serious lung disease                                               [ ] Unexplained or recurrent                     (1 Point)           (in the previous month)                               (1 Point)  [ ] Abnormal pulmonary function test                     (1 Point)                 SURGERY RELATED RISK FACTORS  [ ] Acute myocardial infarction                              (1 Point)               [ ]  Section                                             (1 Point)  [ ] Congestive heart failure (in the previous month)  (1 Point)      [ ] Minor surgery                                                  (1 Point)   [ ] Inflammatory bowel disease                             (1 Point)               [ ] Arthroscopic surgery                                        (2 Points)  [ ] Central venous access                                      (2 Points)                [ x] General surgery lasting more than 45 minutes (2 points)  [ ] Malignancy- Present or previous                   (2 Points)                [ ] Elective arthroplasty                                         (5 points)    [ ] Stroke (in the previous month)                          (5 Points)                                                                                                                                                           HEMATOLOGY RELATED FACTORS                                                 TRAUMA RELATED RISK FACTORS  [ ] Prior episodes of VTE                                     (3 Points)                [ ] Fracture of the hip, pelvis, or leg                       (5 Points)  [ ] Positive family history for VTE                         (3 Points)             [ ] Acute spinal cord injury (in the previous month)  (5 Points)  [ ] Prothrombin 19380 A                                     (3 Points)               [ ] Paralysis  (less than 1 month)                             (5 Points)  [ ] Factor V Leiden                                             (3 Points)                  [ ] Multiple Trauma within 1 month                        (5 Points)  [ ] Lupus anticoagulants                                     (3 Points)                                                           [ ] Anticardiolipin antibodies                               (3 Points)                                                       [ ] High homocysteine in the blood                      (3 Points)                                             [ ] Other congenital or acquired thrombophilia      (3 Points)                                                [ ] Heparin induced thrombocytopenia                  (3 Points)                                     Total Score [      4    ]

## 2019-04-26 NOTE — H&P PST ADULT - NSANTHOSAYNRD_GEN_A_CORE
No. ABNER screening performed.  STOP BANG Legend: 0-2 = LOW Risk; 3-4 = INTERMEDIATE Risk; 5-8 = HIGH Risk

## 2019-04-26 NOTE — H&P PST ADULT - NSICDXPROBLEM_GEN_ALL_CORE_FT
PROBLEM DIAGNOSES  Problem: Spinal stenosis, lumbosacral region  Assessment and Plan: Right L3-5 Hemilaminectomy and Discectomy  Pre-op education provided - all questions answered     Problem: Need for prophylactic measure  Assessment and Plan: The Caprini score indicates this patient is at risk for a VTE event (score 3-5).  Most surgical patients in this group would benefit from pharmacologic prophylaxis.  The surgical team will determine the balance between VTE risk and bleeding risk

## 2019-04-26 NOTE — H&P PST ADULT - RS GEN PE MLT RESP DETAILS PC
AUTH RECEIVED FOR [facility], IT IS GOOD THROUGH Friday, 3/16/18. PATIENT TO HAVE SUPERPUBIC CATH PLACED TODAY. AWAIT DISCHARGE ORDERS TO ARRANGE.
Social Work [address] [place] [address] Planning:    Pt presents to the ED secondary to altered mental status and hematuria. Pt is from home. SW met with pt's son, Syd Hair, outside of pt's room. Pt's son reports pt has dementia but today he was hallucinating and seeing and talking to people that weren't there. Pt lives alone in a one floor home. Pt's daughter and son assist pt at home daily. Pt is currently active with [facility] and has HHAs through [employer]. Pt's neighbor is also a HHA that is able to assist as needed. Pt has hx at [facility]. Pt's PCP is Dr Lizzy Smallwood and he uses [facility] on [facility] [address] for his prescriptions. Pt's son reports plan will be for pt to go to [facility] [place]. SW left voice message with referral information for [facility] with [facility]. Assigned SW to follow.       Electronically signed by Sadie Khan on 3/9/2018 at 9:46 AM
clear to auscultation bilaterally/airway patent/breath sounds equal/good air movement/respirations non-labored

## 2019-04-26 NOTE — H&P PST ADULT - HISTORY OF PRESENT ILLNESS
42 y/o female H/O HTN, Hypothyroid, C/O back pain since 10/2018 MRI done and found to have herniated disc, s/p PT and prednisone taper that provided some improvement however she had another episode of severe back pain, radiating to RLE with numbness and tingling 2/2019 with worsening symptoms over past month. Now scheduled for Right L3-5 Hemilaminectomy and Discectomy on 5/10/19. Denies any palpitations, SOB, N/V, fever or chills.

## 2019-04-26 NOTE — H&P PST ADULT - ATTENDING COMMENTS
43 yo female with L45 DDD/HNP progressively worsening and migrating to L34 level, failed PT, SNAIDS, and MARRY, recommend Right L3-L5 laminectomy and discectomy. RLE weakness 3/5    I reviewed all major risks, benefits, and complications associated with lumbar laminectomy and/or microdiscectomy including but not limited to bleeding, infection, CSF leak, neurological injury, chronic pain, reherniation, hematoma worsening of pain, anesthetic risk, chronic pain and disability, need for further surgical intervention, medical complications (GI, , DVT, PE, cardiac, pulmonary, etc) and risk of mortality.

## 2019-04-26 NOTE — H&P PST ADULT - NEUROLOGICAL DETAILS
responds to verbal commands/sensation intact/alert and oriented x 3/responds to pain/no spontaneous movement

## 2019-04-26 NOTE — H&P PST ADULT - MUSCULOSKELETAL
details… detailed exam no joint erythema/no calf tenderness/decreased ROM due to pain/no joint swelling

## 2019-04-26 NOTE — H&P PST ADULT - NSICDXPASTMEDICALHX_GEN_ALL_CORE_FT
PAST MEDICAL HISTORY:  Acne     Herpes Simplex Type 2 Infection     HTN - Hypertension     Hypertension in Pregnancy, Preeclampsia, Delivered     Hypothyroidism

## 2019-05-06 ENCOUNTER — APPOINTMENT (OUTPATIENT)
Dept: ORTHOPEDIC SURGERY | Facility: CLINIC | Age: 44
End: 2019-05-06
Payer: COMMERCIAL

## 2019-05-06 DIAGNOSIS — M48.07 SPINAL STENOSIS, LUMBOSACRAL REGION: ICD-10-CM

## 2019-05-06 PROCEDURE — 99214 OFFICE O/P EST MOD 30 MIN: CPT

## 2019-05-06 NOTE — PHYSICAL EXAM
[Antalgic] : antalgic [UE Motor Strength NL] : Motor strength of the bilateral upper extremities is normal [NL] : Motor strength of the left lower extremity was normal [Motor Strength Lower Extremities] : right (weak) [] : Sensory: [L4-RT] : L4 [L5-RT] : L5 [1+] : right patella 1+ [ALL] : dorsalis pedis, posterior tibial, femoral, popliteal, and radial 2+ and symmetric bilaterally [2+] : left patella 2+ [Normal] : Alert and in no acute distress [Vásquez's Sign] : negative Vásquez's sign [SLR] : negative straight leg raise [Pronator Drift] : negative pronator drift [de-identified] : ROM Lumbar spine: limited painful\par TTP right paraspinals L region. \par NTTP L spine\par Skin intact L spine. No rashes, ulcers, blisters. \par No lymphedema. \par \par  [de-identified] : EXAM: MR SPINE LUMBAR \par \par \par PROCEDURE DATE: 03/06/2019 \par \par \par \par INTERPRETATION: \par LUMBOSACRAL SPINE MRI \par \par CLINICAL INFORMATION: Low back and right leg pain. \par TECHNIQUE: Multiplanar, multisequence MR imaging was obtained of the \par lumbosacral spine. \par FINDINGS: \par \par DISC LEVEL EVALUATION: \par \par L1/L2: Normal \par L2/L3: Normal \par L3/L4: Normal \par L4/L5: At the level of the disc space there is a moderate sized central and \par right paracentral disc protrusion. In addition extending superiorly from \par this region there is a large fragment of extruded disc material that extends \par craniad in the right paracentral region to nearly the level of the L3-L4 \par disc space. This causes moderate to severe central canal stenosis and severe \par right paracentral stenosis. At the level of the disc space there is moderate \par to severe central canal stenosis and severe right paracentral stenosis. \par These findings likely result in impingement of the right L4 and L5 nerve \par roots. \par L5/S1: There is a small to moderate central disc protrusion that is slightly \par asymmetric to the left and appears to contact the descending left S1 nerve \par root causing mild central canal stenosis. \par \par SPINAL ALIGNMENT: Normal \par DISTAL CORD AND CONUS: The distal cord is normal in appearance. The conus \par terminates at L1 and is unremarkable. \par SI JOINTS: Normal \par MARROW: Marked fatty endplate changes present at L4-L5. \par PARASPINAL MUSCLE AND SOFT TISSUES: Normal \par INTRAABDOMINAL/INTRAPELVIC SOFT TISSUES: Normal \par \par IMPRESSION: Moderate sized central and right paracentral disc protrusion at \par L4-L5 with a superimposed large fragment of extruded disc material that \par extends craniad from the level of the disc space. These findings result in \par central canal stenosis and severe right paracentral stenosis as detailed \par above with likely impingement of the right L4 and L5 nerve roots. \par \par Small to moderate central disc protrusion at L5-S1 that is asymmetric to the \par left and appears to contact the descending left S1 nerve root. \par \par \par LEYDI LUO M.D., ATTENDING RADIOLOGIST Phone #: [phone] \par This document has been electronically signed. Mar 8 2019 12:14PM \par \par 3 views AP and flex/ext of LS Spine from [id number] 03/04/19. Read and dictated by Dr. Ezekiel Fields Board Certified orthopaedic surgeon , DDD L45 \par \par MRI 9/2018 - L45 DDD, Right paracentral disc herniation\par \par \par \par

## 2019-05-06 NOTE — DISCUSSION/SUMMARY
[de-identified] : 43 yo female with L45 DDD/HNP progressively worsening and migrating to L34 level, failed PT, SNAIDS, and MARRY, recommend Right L3-L5 laminectomy and discectomy. \par \par I reviewed all major risks, benefits, and complications associated with lumbar laminectomy and/or microdiscectomy including but not limited to bleeding, infection, CSF leak, neurological injury, chronic pain, reherniation, hematoma worsening of pain, anesthetic risk, chronic pain and disability, need for further surgical intervention, medical complications (GI, , DVT, PE, cardiac, pulmonary, etc) and risk of mortality.\par \par

## 2019-05-06 NOTE — HISTORY OF PRESENT ILLNESS
[Prolonged Standing] : worsened by prolonged standing [Walking] : worsened by walking [NSAIDs] : relieved by nonsteroidal anti-inflammatory drugs [Opioid Analgesics] : relieved by opioid analgesics [4] : an average pain level of 4/10 [3] : a minimum pain level of 3/10 [Constant] : ~He/She~ states the symptoms seem to be constant [6] : a maximum pain level of 6/10 [de-identified] : Pt with L45 DDD/HNP, progressively worsening and migrating to L34 level, failed PT, MARRY, Presents today for f/u and to discuss surgical intervention.  Pain radiates to right buttocks and anterior/lateral aspect of RLE to ankle associated with numbness/tingling in the top of right foot and right ankle, She denies tingling to B/L LE. and RLE weakness.  Pt had LESI X2 on 02/15/2019, this provided some relief in pain for 2 days, but  had an episode of N/V that caused exacerbation of her pain. Pt F/u with Dr Montiel. Pt takes Percocet 5mg BID and Voltaren, these provides mild relief in pain, [Incontinence] : no incontinence [Urinary Ret.] : no urinary retention [de-identified] :  numbness in the right foot exacerbates pain

## 2019-05-09 ENCOUNTER — TRANSCRIPTION ENCOUNTER (OUTPATIENT)
Age: 44
End: 2019-05-09

## 2019-05-10 ENCOUNTER — RESULT REVIEW (OUTPATIENT)
Age: 44
End: 2019-05-10

## 2019-05-10 ENCOUNTER — APPOINTMENT (OUTPATIENT)
Dept: ORTHOPEDIC SURGERY | Facility: HOSPITAL | Age: 44
End: 2019-05-10

## 2019-05-10 ENCOUNTER — INPATIENT (INPATIENT)
Facility: HOSPITAL | Age: 44
LOS: 0 days | Discharge: HOME | DRG: 520 | End: 2019-05-10
Attending: ORTHOPAEDIC SURGERY | Admitting: ORTHOPAEDIC SURGERY
Payer: COMMERCIAL

## 2019-05-10 VITALS
HEIGHT: 62 IN | WEIGHT: 171.96 LBS | OXYGEN SATURATION: 100 % | SYSTOLIC BLOOD PRESSURE: 147 MMHG | DIASTOLIC BLOOD PRESSURE: 10 MMHG | HEART RATE: 90 BPM | TEMPERATURE: 99 F | RESPIRATION RATE: 17 BRPM

## 2019-05-10 VITALS — SYSTOLIC BLOOD PRESSURE: 116 MMHG | HEART RATE: 90 BPM | DIASTOLIC BLOOD PRESSURE: 83 MMHG

## 2019-05-10 DIAGNOSIS — Z98.51 TUBAL LIGATION STATUS: Chronic | ICD-10-CM

## 2019-05-10 DIAGNOSIS — M48.07 SPINAL STENOSIS, LUMBOSACRAL REGION: ICD-10-CM

## 2019-05-10 PROCEDURE — 88304 TISSUE EXAM BY PATHOLOGIST: CPT

## 2019-05-10 PROCEDURE — 63056 DECOMPRESS SPINAL CORD LMBR: CPT

## 2019-05-10 PROCEDURE — 72020 X-RAY EXAM OF SPINE 1 VIEW: CPT | Mod: 26

## 2019-05-10 PROCEDURE — 72020 X-RAY EXAM OF SPINE 1 VIEW: CPT

## 2019-05-10 PROCEDURE — 93010 ELECTROCARDIOGRAM REPORT: CPT

## 2019-05-10 PROCEDURE — 93005 ELECTROCARDIOGRAM TRACING: CPT

## 2019-05-10 PROCEDURE — 97161 PT EVAL LOW COMPLEX 20 MIN: CPT

## 2019-05-10 PROCEDURE — 88304 TISSUE EXAM BY PATHOLOGIST: CPT | Mod: 26

## 2019-05-10 PROCEDURE — C1889: CPT

## 2019-05-10 RX ORDER — DEXAMETHASONE 0.5 MG/5ML
5 ELIXIR ORAL EVERY 6 HOURS
Refills: 0 | Status: DISCONTINUED | OUTPATIENT
Start: 2019-05-10 | End: 2019-05-10

## 2019-05-10 RX ORDER — HYDROMORPHONE HYDROCHLORIDE 2 MG/ML
1 INJECTION INTRAMUSCULAR; INTRAVENOUS; SUBCUTANEOUS EVERY 6 HOURS
Refills: 0 | Status: DISCONTINUED | OUTPATIENT
Start: 2019-05-10 | End: 2019-05-10

## 2019-05-10 RX ORDER — SENNA PLUS 8.6 MG/1
2 TABLET ORAL
Qty: 0 | Refills: 0 | DISCHARGE
Start: 2019-05-10

## 2019-05-10 RX ORDER — DEXAMETHASONE 0.5 MG/5ML
1 ELIXIR ORAL
Qty: 4 | Refills: 0
Start: 2019-05-10 | End: 2019-05-10

## 2019-05-10 RX ORDER — DEXAMETHASONE 0.5 MG/5ML
ELIXIR ORAL
Refills: 0 | Status: DISCONTINUED | OUTPATIENT
Start: 2019-05-10 | End: 2019-05-10

## 2019-05-10 RX ORDER — CYCLOBENZAPRINE HYDROCHLORIDE 10 MG/1
10 TABLET, FILM COATED ORAL EVERY 8 HOURS
Refills: 0 | Status: DISCONTINUED | OUTPATIENT
Start: 2019-05-10 | End: 2019-05-10

## 2019-05-10 RX ORDER — LEVOTHYROXINE SODIUM 125 MCG
137 TABLET ORAL DAILY
Refills: 0 | Status: DISCONTINUED | OUTPATIENT
Start: 2019-05-10 | End: 2019-05-10

## 2019-05-10 RX ORDER — DOCUSATE SODIUM 100 MG
1 CAPSULE ORAL
Qty: 0 | Refills: 0 | DISCHARGE
Start: 2019-05-10

## 2019-05-10 RX ORDER — ONDANSETRON 8 MG/1
4 TABLET, FILM COATED ORAL ONCE
Refills: 0 | Status: COMPLETED | OUTPATIENT
Start: 2019-05-10 | End: 2019-05-10

## 2019-05-10 RX ORDER — HALOPERIDOL DECANOATE 100 MG/ML
1 INJECTION INTRAMUSCULAR ONCE
Refills: 0 | Status: DISCONTINUED | OUTPATIENT
Start: 2019-05-10 | End: 2019-05-10

## 2019-05-10 RX ORDER — PANTOPRAZOLE SODIUM 20 MG/1
1 TABLET, DELAYED RELEASE ORAL
Qty: 30 | Refills: 0
Start: 2019-05-10 | End: 2019-06-08

## 2019-05-10 RX ORDER — HYDROMORPHONE HYDROCHLORIDE 2 MG/ML
0.5 INJECTION INTRAMUSCULAR; INTRAVENOUS; SUBCUTANEOUS
Refills: 0 | Status: DISCONTINUED | OUTPATIENT
Start: 2019-05-10 | End: 2019-05-10

## 2019-05-10 RX ORDER — DULOXETINE HYDROCHLORIDE 30 MG/1
60 CAPSULE, DELAYED RELEASE ORAL DAILY
Refills: 0 | Status: DISCONTINUED | OUTPATIENT
Start: 2019-05-10 | End: 2019-05-10

## 2019-05-10 RX ORDER — SODIUM CHLORIDE 9 MG/ML
1000 INJECTION, SOLUTION INTRAVENOUS
Refills: 0 | Status: DISCONTINUED | OUTPATIENT
Start: 2019-05-10 | End: 2019-05-10

## 2019-05-10 RX ORDER — PANTOPRAZOLE SODIUM 20 MG/1
40 TABLET, DELAYED RELEASE ORAL
Refills: 0 | Status: DISCONTINUED | OUTPATIENT
Start: 2019-05-10 | End: 2019-05-10

## 2019-05-10 RX ORDER — DEXAMETHASONE 0.5 MG/5ML
1 ELIXIR ORAL EVERY 6 HOURS
Refills: 0 | Status: CANCELLED | OUTPATIENT
Start: 2019-05-12 | End: 2019-05-10

## 2019-05-10 RX ORDER — KETOROLAC TROMETHAMINE 30 MG/ML
15 SYRINGE (ML) INJECTION ONCE
Refills: 0 | Status: DISCONTINUED | OUTPATIENT
Start: 2019-05-10 | End: 2019-05-10

## 2019-05-10 RX ORDER — DEXAMETHASONE 0.5 MG/5ML
3 ELIXIR ORAL EVERY 6 HOURS
Refills: 0 | Status: DISCONTINUED | OUTPATIENT
Start: 2019-05-11 | End: 2019-05-10

## 2019-05-10 RX ORDER — VALACYCLOVIR 500 MG/1
1000 TABLET, FILM COATED ORAL DAILY
Refills: 0 | Status: DISCONTINUED | OUTPATIENT
Start: 2019-05-10 | End: 2019-05-10

## 2019-05-10 RX ORDER — DOCUSATE SODIUM 100 MG
100 CAPSULE ORAL THREE TIMES A DAY
Refills: 0 | Status: DISCONTINUED | OUTPATIENT
Start: 2019-05-10 | End: 2019-05-10

## 2019-05-10 RX ORDER — HYDROCHLOROTHIAZIDE 25 MG
50 TABLET ORAL DAILY
Refills: 0 | Status: DISCONTINUED | OUTPATIENT
Start: 2019-05-10 | End: 2019-05-10

## 2019-05-10 RX ORDER — CHLORHEXIDINE GLUCONATE 213 G/1000ML
1 SOLUTION TOPICAL ONCE
Refills: 0 | Status: COMPLETED | OUTPATIENT
Start: 2019-05-10 | End: 2019-05-10

## 2019-05-10 RX ORDER — FAMOTIDINE 10 MG/ML
20 INJECTION INTRAVENOUS EVERY 12 HOURS
Refills: 0 | Status: DISCONTINUED | OUTPATIENT
Start: 2019-05-10 | End: 2019-05-10

## 2019-05-10 RX ORDER — ACETAMINOPHEN 500 MG
1000 TABLET ORAL ONCE
Refills: 0 | Status: DISCONTINUED | OUTPATIENT
Start: 2019-05-10 | End: 2019-05-10

## 2019-05-10 RX ORDER — SODIUM CHLORIDE 9 MG/ML
3 INJECTION INTRAMUSCULAR; INTRAVENOUS; SUBCUTANEOUS EVERY 8 HOURS
Refills: 0 | Status: DISCONTINUED | OUTPATIENT
Start: 2019-05-10 | End: 2019-05-10

## 2019-05-10 RX ORDER — CEFAZOLIN SODIUM 1 G
2000 VIAL (EA) INJECTION ONCE
Refills: 0 | Status: DISCONTINUED | OUTPATIENT
Start: 2019-05-10 | End: 2019-05-10

## 2019-05-10 RX ORDER — DIAZEPAM 5 MG
5 TABLET ORAL EVERY 12 HOURS
Refills: 0 | Status: DISCONTINUED | OUTPATIENT
Start: 2019-05-10 | End: 2019-05-10

## 2019-05-10 RX ORDER — LIDOCAINE HCL 20 MG/ML
0.2 VIAL (ML) INJECTION ONCE
Refills: 0 | Status: DISCONTINUED | OUTPATIENT
Start: 2019-05-10 | End: 2019-05-10

## 2019-05-10 RX ORDER — FAMOTIDINE 10 MG/ML
20 INJECTION INTRAVENOUS ONCE
Refills: 0 | Status: COMPLETED | OUTPATIENT
Start: 2019-05-10 | End: 2019-05-10

## 2019-05-10 RX ORDER — CEFAZOLIN SODIUM 1 G
2000 VIAL (EA) INJECTION EVERY 8 HOURS
Refills: 0 | Status: DISCONTINUED | OUTPATIENT
Start: 2019-05-10 | End: 2019-05-10

## 2019-05-10 RX ORDER — ACETAMINOPHEN 500 MG
650 TABLET ORAL EVERY 6 HOURS
Refills: 0 | Status: DISCONTINUED | OUTPATIENT
Start: 2019-05-10 | End: 2019-05-10

## 2019-05-10 RX ORDER — AMLODIPINE BESYLATE 2.5 MG/1
5 TABLET ORAL DAILY
Refills: 0 | Status: DISCONTINUED | OUTPATIENT
Start: 2019-05-10 | End: 2019-05-10

## 2019-05-10 RX ORDER — DEXAMETHASONE 0.5 MG/5ML
4 ELIXIR ORAL EVERY 6 HOURS
Refills: 0 | Status: DISCONTINUED | OUTPATIENT
Start: 2019-05-10 | End: 2019-05-10

## 2019-05-10 RX ORDER — SENNA PLUS 8.6 MG/1
2 TABLET ORAL AT BEDTIME
Refills: 0 | Status: DISCONTINUED | OUTPATIENT
Start: 2019-05-10 | End: 2019-05-10

## 2019-05-10 RX ADMIN — HYDROMORPHONE HYDROCHLORIDE 0.5 MILLIGRAM(S): 2 INJECTION INTRAMUSCULAR; INTRAVENOUS; SUBCUTANEOUS at 12:35

## 2019-05-10 RX ADMIN — ONDANSETRON 4 MILLIGRAM(S): 8 TABLET, FILM COATED ORAL at 14:42

## 2019-05-10 RX ADMIN — FAMOTIDINE 20 MILLIGRAM(S): 10 INJECTION INTRAVENOUS at 11:34

## 2019-05-10 RX ADMIN — SODIUM CHLORIDE 100 MILLILITER(S): 9 INJECTION, SOLUTION INTRAVENOUS at 12:42

## 2019-05-10 RX ADMIN — HYDROMORPHONE HYDROCHLORIDE 0.5 MILLIGRAM(S): 2 INJECTION INTRAMUSCULAR; INTRAVENOUS; SUBCUTANEOUS at 11:30

## 2019-05-10 RX ADMIN — CHLORHEXIDINE GLUCONATE 1 APPLICATION(S): 213 SOLUTION TOPICAL at 06:46

## 2019-05-10 RX ADMIN — Medication 100 MILLIGRAM(S): at 14:46

## 2019-05-10 RX ADMIN — HYDROMORPHONE HYDROCHLORIDE 0.5 MILLIGRAM(S): 2 INJECTION INTRAMUSCULAR; INTRAVENOUS; SUBCUTANEOUS at 10:39

## 2019-05-10 RX ADMIN — HYDROMORPHONE HYDROCHLORIDE 0.5 MILLIGRAM(S): 2 INJECTION INTRAMUSCULAR; INTRAVENOUS; SUBCUTANEOUS at 11:07

## 2019-05-10 NOTE — PHYSICAL THERAPY INITIAL EVALUATION ADULT - PERTINENT HX OF CURRENT PROBLEM, REHAB EVAL
Pt is a 43 y/o female presenting to [facility] on 5/10/19 H/O HTN, Hypothyroid, C/O back pain since 10/2018 MRI done and found to have herniated disc, s/p PT and prednisone taper that provided some improvement however she had another episode of severe back pain, radiating to RLE with numbness and tingling 2/2019 with worsening symptoms over past month. Pt is now s/p L4-5 laminotomy on 5/10.

## 2019-05-10 NOTE — ASU DISCHARGE PLAN (ADULT/PEDIATRIC) - CARE PROVIDER_API CALL
Ezekiel Fields)  Orthopedics  [address]  [place] [address]  Phone: [phone]  Fax: [phone]  Follow Up Time:

## 2019-05-10 NOTE — BRIEF OPERATIVE NOTE - OPERATION/FINDINGS
L4-5 laminotomy  Preop: Herniated disc L4-5  Postop: Herniated disc L4-5 with extruded fragment towards L3

## 2019-05-10 NOTE — PHYSICAL THERAPY INITIAL EVALUATION ADULT - ADDITIONAL COMMENTS
Pt lives in an apt with no steps to negotiate. Pt was Ind with all ADLs and amb without AD. Pt works as an LPN at [employer].

## 2019-05-10 NOTE — ASU DISCHARGE PLAN (ADULT/PEDIATRIC) - NURSING INSTRUCTIONS
Please follow-up with Dr. Fields in 7 days. Call office for appointment. Do not get dressing wet and keep on until appointment. Percocet 1 tab for moderate pain, 2 tabs for severe pain. Flexeril for muscle spasms. Please take dexamethasone steroid tablets for first 3 days: 4 mg the first day every 6 hours, 2 mg the second day every 6 hours, and 1 mg the third day every 6 hours.

## 2019-05-10 NOTE — ASU DISCHARGE PLAN (ADULT/PEDIATRIC) - BATHING
Do not submerge in water/do not get dressing wet Sponge only/Do not submerge in water/do not get dressing wet

## 2019-05-16 ENCOUNTER — APPOINTMENT (OUTPATIENT)
Dept: ORTHOPEDIC SURGERY | Facility: CLINIC | Age: 44
End: 2019-05-16
Payer: COMMERCIAL

## 2019-05-16 PROCEDURE — 99024 POSTOP FOLLOW-UP VISIT: CPT

## 2019-05-20 LAB — SURGICAL PATHOLOGY STUDY: SIGNIFICANT CHANGE UP

## 2019-06-03 ENCOUNTER — TRANSCRIPTION ENCOUNTER (OUTPATIENT)
Age: 44
End: 2019-06-03

## 2019-06-03 ENCOUNTER — APPOINTMENT (OUTPATIENT)
Dept: ORTHOPEDIC SURGERY | Facility: CLINIC | Age: 44
End: 2019-06-03
Payer: COMMERCIAL

## 2019-06-03 PROCEDURE — 99024 POSTOP FOLLOW-UP VISIT: CPT

## 2019-07-01 ENCOUNTER — APPOINTMENT (OUTPATIENT)
Dept: ORTHOPEDIC SURGERY | Facility: CLINIC | Age: 44
End: 2019-07-01
Payer: COMMERCIAL

## 2019-07-01 PROCEDURE — 99024 POSTOP FOLLOW-UP VISIT: CPT

## 2019-07-30 ENCOUNTER — RX RENEWAL (OUTPATIENT)
Age: 44
End: 2019-07-30

## 2019-09-23 ENCOUNTER — APPOINTMENT (OUTPATIENT)
Dept: ORTHOPEDIC SURGERY | Facility: CLINIC | Age: 44
End: 2019-09-23
Payer: COMMERCIAL

## 2019-09-23 PROCEDURE — 99214 OFFICE O/P EST MOD 30 MIN: CPT

## 2019-09-26 ENCOUNTER — RESULT REVIEW (OUTPATIENT)
Age: 44
End: 2019-09-26

## 2020-03-05 ENCOUNTER — APPOINTMENT (OUTPATIENT)
Dept: ORTHOPEDIC SURGERY | Facility: CLINIC | Age: 45
End: 2020-03-05
Payer: COMMERCIAL

## 2020-03-05 PROCEDURE — 99214 OFFICE O/P EST MOD 30 MIN: CPT

## 2020-03-05 PROCEDURE — 72100 X-RAY EXAM L-S SPINE 2/3 VWS: CPT

## 2020-03-18 NOTE — H&P PST ADULT - NSICDXPASTSURGICALHX_GEN_ALL_CORE_FT
PAST SURGICAL HISTORY:  History of  Section PAST SURGICAL HISTORY:  H/O tubal ligation     History of  Section Complex Repair And Dermal Autograft Text: The defect edges were debeveled with a #15 scalpel blade.  The primary defect was closed partially with a complex linear closure.  Given the location of the defect, shape of the defect and the proximity to free margins an dermal autograft was deemed most appropriate to repair the remaining defect.  The graft was trimmed to fit the size of the remaining defect.  The graft was then placed in the primary defect, oriented appropriately, and sutured into place.

## 2020-04-06 ENCOUNTER — APPOINTMENT (OUTPATIENT)
Dept: DISASTER EMERGENCY | Facility: CLINIC | Age: 45
End: 2020-04-06

## 2020-04-10 ENCOUNTER — INPATIENT (INPATIENT)
Facility: HOSPITAL | Age: 45
LOS: 7 days | Discharge: HOME | DRG: 871 | End: 2020-04-18
Attending: INTERNAL MEDICINE | Admitting: HOSPITALIST
Payer: COMMERCIAL

## 2020-04-10 VITALS
HEIGHT: 63 IN | OXYGEN SATURATION: 92 % | TEMPERATURE: 102 F | RESPIRATION RATE: 24 BRPM | SYSTOLIC BLOOD PRESSURE: 163 MMHG | HEART RATE: 109 BPM | WEIGHT: 169.98 LBS | DIASTOLIC BLOOD PRESSURE: 119 MMHG

## 2020-04-10 DIAGNOSIS — E03.9 HYPOTHYROIDISM, UNSPECIFIED: ICD-10-CM

## 2020-04-10 DIAGNOSIS — I10 ESSENTIAL (PRIMARY) HYPERTENSION: ICD-10-CM

## 2020-04-10 DIAGNOSIS — U07.1 COVID-19: ICD-10-CM

## 2020-04-10 DIAGNOSIS — Z98.51 TUBAL LIGATION STATUS: Chronic | ICD-10-CM

## 2020-04-10 DIAGNOSIS — R09.02 HYPOXEMIA: ICD-10-CM

## 2020-04-10 LAB
ALBUMIN SERPL ELPH-MCNC: 3.8 G/DL — SIGNIFICANT CHANGE UP (ref 3.3–5)
ALP SERPL-CCNC: 67 U/L — SIGNIFICANT CHANGE UP (ref 40–120)
ALT FLD-CCNC: 21 U/L — SIGNIFICANT CHANGE UP (ref 10–45)
ANION GAP SERPL CALC-SCNC: 15 MMOL/L — SIGNIFICANT CHANGE UP (ref 5–17)
AST SERPL-CCNC: 36 U/L — SIGNIFICANT CHANGE UP (ref 10–40)
BASOPHILS # BLD AUTO: 0 K/UL — SIGNIFICANT CHANGE UP (ref 0–0.2)
BASOPHILS NFR BLD AUTO: 0 % — SIGNIFICANT CHANGE UP (ref 0–2)
BILIRUB SERPL-MCNC: 0.3 MG/DL — SIGNIFICANT CHANGE UP (ref 0.2–1.2)
BUN SERPL-MCNC: 8 MG/DL — SIGNIFICANT CHANGE UP (ref 7–23)
CALCIUM SERPL-MCNC: 8.9 MG/DL — SIGNIFICANT CHANGE UP (ref 8.4–10.5)
CHLORIDE SERPL-SCNC: 102 MMOL/L — SIGNIFICANT CHANGE UP (ref 96–108)
CK SERPL-CCNC: 266 U/L — HIGH (ref 25–170)
CO2 SERPL-SCNC: 24 MMOL/L — SIGNIFICANT CHANGE UP (ref 22–31)
CREAT SERPL-MCNC: 0.96 MG/DL — SIGNIFICANT CHANGE UP (ref 0.5–1.3)
CRP SERPL-MCNC: 4.36 MG/DL — HIGH (ref 0–0.4)
D DIMER BLD IA.RAPID-MCNC: 203 NG/ML DDU — SIGNIFICANT CHANGE UP
EOSINOPHIL # BLD AUTO: 0 K/UL — SIGNIFICANT CHANGE UP (ref 0–0.5)
EOSINOPHIL NFR BLD AUTO: 0 % — SIGNIFICANT CHANGE UP (ref 0–6)
FERRITIN SERPL-MCNC: 37 NG/ML — SIGNIFICANT CHANGE UP (ref 15–150)
GAS PNL BLDV: SIGNIFICANT CHANGE UP
GLUCOSE SERPL-MCNC: 88 MG/DL — SIGNIFICANT CHANGE UP (ref 70–99)
HCT VFR BLD CALC: 25.8 % — LOW (ref 34.5–45)
HGB BLD-MCNC: 7.2 G/DL — LOW (ref 11.5–15.5)
LDH SERPL L TO P-CCNC: 472 U/L — HIGH (ref 50–242)
LYMPHOCYTES # BLD AUTO: 0.71 K/UL — LOW (ref 1–3.3)
LYMPHOCYTES # BLD AUTO: 12.1 % — LOW (ref 13–44)
MCHC RBC-ENTMCNC: 18.7 PG — LOW (ref 27–34)
MCHC RBC-ENTMCNC: 27.9 GM/DL — LOW (ref 32–36)
MCV RBC AUTO: 67 FL — LOW (ref 80–100)
MONOCYTES # BLD AUTO: 0.15 K/UL — SIGNIFICANT CHANGE UP (ref 0–0.9)
MONOCYTES NFR BLD AUTO: 2.6 % — SIGNIFICANT CHANGE UP (ref 2–14)
NEUTROPHILS # BLD AUTO: 4.97 K/UL — SIGNIFICANT CHANGE UP (ref 1.8–7.4)
NEUTROPHILS NFR BLD AUTO: 83.6 % — HIGH (ref 43–77)
PLATELET # BLD AUTO: 180 K/UL — SIGNIFICANT CHANGE UP (ref 150–400)
POTASSIUM SERPL-MCNC: 3.7 MMOL/L — SIGNIFICANT CHANGE UP (ref 3.5–5.3)
POTASSIUM SERPL-SCNC: 3.7 MMOL/L — SIGNIFICANT CHANGE UP (ref 3.5–5.3)
PROCALCITONIN SERPL-MCNC: 0.17 NG/ML — HIGH (ref 0.02–0.1)
PROT SERPL-MCNC: 7.5 G/DL — SIGNIFICANT CHANGE UP (ref 6–8.3)
RBC # BLD: 3.85 M/UL — SIGNIFICANT CHANGE UP (ref 3.8–5.2)
RBC # FLD: 20.8 % — HIGH (ref 10.3–14.5)
SODIUM SERPL-SCNC: 141 MMOL/L — SIGNIFICANT CHANGE UP (ref 135–145)
WBC # BLD: 5.83 K/UL — SIGNIFICANT CHANGE UP (ref 3.8–10.5)
WBC # FLD AUTO: 5.83 K/UL — SIGNIFICANT CHANGE UP (ref 3.8–10.5)

## 2020-04-10 PROCEDURE — 71045 X-RAY EXAM CHEST 1 VIEW: CPT | Mod: 26

## 2020-04-10 PROCEDURE — 99233 SBSQ HOSP IP/OBS HIGH 50: CPT

## 2020-04-10 PROCEDURE — 99285 EMERGENCY DEPT VISIT HI MDM: CPT

## 2020-04-10 PROCEDURE — 93010 ELECTROCARDIOGRAM REPORT: CPT

## 2020-04-10 RX ORDER — ALBUTEROL 90 UG/1
2 AEROSOL, METERED ORAL EVERY 4 HOURS
Refills: 0 | Status: DISCONTINUED | OUTPATIENT
Start: 2020-04-10 | End: 2020-04-18

## 2020-04-10 RX ORDER — MELOXICAM 15 MG/1
1 TABLET ORAL
Qty: 0 | Refills: 0 | DISCHARGE

## 2020-04-10 RX ORDER — HYDROCHLOROTHIAZIDE 25 MG
50 TABLET ORAL DAILY
Refills: 0 | Status: DISCONTINUED | OUTPATIENT
Start: 2020-04-10 | End: 2020-04-18

## 2020-04-10 RX ORDER — HYDROXYCHLOROQUINE SULFATE 200 MG
800 TABLET ORAL EVERY 24 HOURS
Refills: 0 | Status: COMPLETED | OUTPATIENT
Start: 2020-04-10 | End: 2020-04-10

## 2020-04-10 RX ORDER — ACETAMINOPHEN 500 MG
975 TABLET ORAL ONCE
Refills: 0 | Status: COMPLETED | OUTPATIENT
Start: 2020-04-10 | End: 2020-04-10

## 2020-04-10 RX ORDER — HYDROXYCHLOROQUINE SULFATE 200 MG
400 TABLET ORAL EVERY 24 HOURS
Refills: 0 | Status: COMPLETED | OUTPATIENT
Start: 2020-04-11 | End: 2020-04-14

## 2020-04-10 RX ORDER — AMLODIPINE BESYLATE 2.5 MG/1
1 TABLET ORAL
Qty: 0 | Refills: 0 | DISCHARGE

## 2020-04-10 RX ORDER — AMLODIPINE BESYLATE 2.5 MG/1
5 TABLET ORAL DAILY
Refills: 0 | Status: DISCONTINUED | OUTPATIENT
Start: 2020-04-10 | End: 2020-04-10

## 2020-04-10 RX ORDER — AMLODIPINE BESYLATE 2.5 MG/1
10 TABLET ORAL DAILY
Refills: 0 | Status: DISCONTINUED | OUTPATIENT
Start: 2020-04-10 | End: 2020-04-18

## 2020-04-10 RX ORDER — GUAIFENESIN/DEXTROMETHORPHAN 600MG-30MG
10 TABLET, EXTENDED RELEASE 12 HR ORAL EVERY 4 HOURS
Refills: 0 | Status: DISCONTINUED | OUTPATIENT
Start: 2020-04-10 | End: 2020-04-18

## 2020-04-10 RX ORDER — CYCLOBENZAPRINE HYDROCHLORIDE 10 MG/1
1 TABLET, FILM COATED ORAL
Qty: 21 | Refills: 0

## 2020-04-10 RX ORDER — LEVOTHYROXINE SODIUM 125 MCG
137 TABLET ORAL DAILY
Refills: 0 | Status: DISCONTINUED | OUTPATIENT
Start: 2020-04-10 | End: 2020-04-18

## 2020-04-10 RX ORDER — DULOXETINE HYDROCHLORIDE 30 MG/1
60 CAPSULE, DELAYED RELEASE ORAL DAILY
Refills: 0 | Status: DISCONTINUED | OUTPATIENT
Start: 2020-04-10 | End: 2020-04-18

## 2020-04-10 RX ORDER — HYDROXYCHLOROQUINE SULFATE 200 MG
TABLET ORAL
Refills: 0 | Status: COMPLETED | OUTPATIENT
Start: 2020-04-10 | End: 2020-04-14

## 2020-04-10 RX ORDER — VALACYCLOVIR 500 MG/1
1 TABLET, FILM COATED ORAL
Qty: 0 | Refills: 0 | DISCHARGE

## 2020-04-10 RX ORDER — ENOXAPARIN SODIUM 100 MG/ML
40 INJECTION SUBCUTANEOUS EVERY 12 HOURS
Refills: 0 | Status: DISCONTINUED | OUTPATIENT
Start: 2020-04-10 | End: 2020-04-18

## 2020-04-10 RX ORDER — ACETAMINOPHEN 500 MG
650 TABLET ORAL EVERY 4 HOURS
Refills: 0 | Status: DISCONTINUED | OUTPATIENT
Start: 2020-04-10 | End: 2020-04-18

## 2020-04-10 RX ADMIN — ENOXAPARIN SODIUM 40 MILLIGRAM(S): 100 INJECTION SUBCUTANEOUS at 17:13

## 2020-04-10 RX ADMIN — ALBUTEROL 2 PUFF(S): 90 AEROSOL, METERED ORAL at 21:39

## 2020-04-10 RX ADMIN — Medication 10 MILLILITER(S): at 21:39

## 2020-04-10 RX ADMIN — Medication 975 MILLIGRAM(S): at 10:27

## 2020-04-10 RX ADMIN — AMLODIPINE BESYLATE 10 MILLIGRAM(S): 2.5 TABLET ORAL at 17:13

## 2020-04-10 RX ADMIN — Medication 100 MILLIGRAM(S): at 21:39

## 2020-04-10 RX ADMIN — Medication 800 MILLIGRAM(S): at 17:11

## 2020-04-10 NOTE — ED ADULT TRIAGE NOTE - CHIEF COMPLAINT QUOTE
cough and shortness of breath x 10 days worse for the past two days.  Covid + pain with coughing and inspiration

## 2020-04-10 NOTE — ED ADULT NURSE NOTE - OBJECTIVE STATEMENT
45 y/o female hx hypothyroidism, HTN presents to the ED from home c/o x SOB x 10 days. 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 Fever tmax 102F. Pt tested + for COVID outpatient. Denies n/v, weakness, abd pain, diarrhea/constipation, numbness/tingling, urinary s/s. Pt A&Ox3, in no respiratory distress, no CP, pt tachypneic to 30 RR per min, desaturation s/p ambulation to 87% on RA, placed on 2L O2 NC. PT safety maintained with family at bedside, call bell within reach and bed in the lowest position.

## 2020-04-10 NOTE — ED ADULT NURSE NOTE - CHPI ED NUR SYMPTOMS NEG
no decreased eating/drinking/no abdominal pain/no chills/no diarrhea/no rash/no vomiting/no headache

## 2020-04-10 NOTE — H&P ADULT - NSHPLABSRESULTS_GEN_ALL_CORE
.  LABS:                         7.2    5.83  )-----------( 180      ( 10 Apr 2020 10:48 )             25.8     04-10    141  |  102  |  8   ----------------------------<  88  3.7   |  24  |  0.96    Ca    8.9      10 Apr 2020 10:48    TPro  7.5  /  Alb  3.8  /  TBili  0.3  /  DBili  x   /  AST  36  /  ALT  21  /  AlkPhos  67  04-10              RADIOLOGY, EKG & ADDITIONAL TESTS: Reviewed.   CXR personally reviewed: +bilateral opacities

## 2020-04-10 NOTE — ED PROVIDER NOTE - ATTENDING CONTRIBUTION TO CARE
Agree with above, Jas Correa MD, FACEP  This patient was seen during the time of the COVID-19 pandemic, the care of this patient was in support of the [facility] countermeasures to COVID-19.   Jas Correa MD, FACEP: In this physician's medical judgement based on clinical history and physical exam, patient hypoxic with COVID-19 pneumonia, will require inpatient admission for hypoxia to 87% on exertion, home oxygen prn and ID evaluation.    Based on patient's history and physical exam, as well as the results of today's workup, I feel that patient warrants admission to the hospital for further workup/evaluation and continued management. I discussed the findings of today's workup with the patient and addressed the patient's questions and concerns. The patient was agreeable with admission. Our team spoke with the inpatient receiving team who accepted the patient for admission and subsequently took over the patient's care at the time of admission.

## 2020-04-10 NOTE — H&P ADULT - HISTORY OF PRESENT ILLNESS
44f hx obesity, HTN, anxiety, hypothyroidism presenting with progressive SOB and wheeze x 10 days, was tested positive for covid on 4/6/20. Notes no sick contacts or recent travel. Works as RN at [employer]. +one episodes of nausea and vomiting induced by coughing 3 days ago, +1 episode of watery stool, no further episodes since.     In ED: 102.2, 109, 163/114, 24, 87RA. Given tylenol 975mg po x 1.

## 2020-04-10 NOTE — ED PROVIDER NOTE - CLINICAL SUMMARY MEDICAL DECISION MAKING FREE TEXT BOX
Pt known COVID19 (+) on day 10 of illness p/w worsening SOB and tachypnea, hypoxic 91% at rest and 87% while ambulating, placed on NC 3L and satting 96%, pt tba for o2 supplementation and trending of covid reactive labs, CXR and EKG as well, pt appears more comfortable after O2 administration, no accessory muscle use at this time- [place]

## 2020-04-10 NOTE — H&P ADULT - PROBLEM SELECTOR PLAN 1
with hypoxia to 87% on RA, improved to 97% on 2L NC  - start plaquenil 5 day protocol, qtc < 500  - o2 supplementation as needed- if escalates to 100% NRB, consider solu-medrol 1-1.5mg/kg iv daily in two divided doses x 3-5 days  - avoid HFNC/bilevel- if needs o2 beyond NRB, would prone patient and call MICU consult for intubation  - avoid NRB + NC combo  - emailed ID for consideration in regeneron trial- trial enrollment is closed this weak per ID  - trend q3 day cbc w/diff, CMP, d-dimer, ferritin, CRP, CK, LDH

## 2020-04-10 NOTE — ED PROVIDER NOTE - CARE PLAN
Principal Discharge DX:	Hypoxia Principal Discharge DX:	Hypoxia  Secondary Diagnosis:	Pneumonia due to 2019 novel coronavirus

## 2020-04-10 NOTE — ED PROVIDER NOTE - SEVERE SEPSIS ALERT DETAILS
The patient is not septic based on this physician's clinical assessment and medical judgement.   patient with viral pneumonia and without evidence of bacterial sepsis

## 2020-04-10 NOTE — H&P ADULT - ASSESSMENT
44f hx obestiy, HTN, anxiety, hypothyroidism presenting with sepsis 2/2 covid19 diagnosed via OP swab on 4/6/20

## 2020-04-10 NOTE — H&P ADULT - PROBLEM SELECTOR PLAN 3
Pt with diastolic HTN on presentation  - continue hctz 50mg po daily with hold parameters  - will uptitrate amlodipine to 10mg po daily with hold parameters

## 2020-04-10 NOTE — ED PROVIDER NOTE - OBJECTIVE STATEMENT
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 Pt stating since yesterday she has felt very out of breath even at rest and her chills have been more persistent. Pt reports mild nausea, denies abd pain or vomiting, denies diarrhea.

## 2020-04-11 LAB
ALBUMIN SERPL ELPH-MCNC: 3.6 G/DL — SIGNIFICANT CHANGE UP (ref 3.3–5)
ALP SERPL-CCNC: 68 U/L — SIGNIFICANT CHANGE UP (ref 40–120)
ALT FLD-CCNC: 20 U/L — SIGNIFICANT CHANGE UP (ref 10–45)
ANION GAP SERPL CALC-SCNC: 14 MMOL/L — SIGNIFICANT CHANGE UP (ref 5–17)
AST SERPL-CCNC: 32 U/L — SIGNIFICANT CHANGE UP (ref 10–40)
BILIRUB SERPL-MCNC: 0.3 MG/DL — SIGNIFICANT CHANGE UP (ref 0.2–1.2)
BUN SERPL-MCNC: 8 MG/DL — SIGNIFICANT CHANGE UP (ref 7–23)
CALCIUM SERPL-MCNC: 8.6 MG/DL — SIGNIFICANT CHANGE UP (ref 8.4–10.5)
CHLORIDE SERPL-SCNC: 102 MMOL/L — SIGNIFICANT CHANGE UP (ref 96–108)
CK SERPL-CCNC: 263 U/L — HIGH (ref 25–170)
CO2 SERPL-SCNC: 25 MMOL/L — SIGNIFICANT CHANGE UP (ref 22–31)
CREAT SERPL-MCNC: 0.86 MG/DL — SIGNIFICANT CHANGE UP (ref 0.5–1.3)
CRP SERPL-MCNC: 7.22 MG/DL — HIGH (ref 0–0.4)
D DIMER BLD IA.RAPID-MCNC: 156 NG/ML DDU — SIGNIFICANT CHANGE UP
FERRITIN SERPL-MCNC: 36 NG/ML — SIGNIFICANT CHANGE UP (ref 15–150)
GLUCOSE SERPL-MCNC: 91 MG/DL — SIGNIFICANT CHANGE UP (ref 70–99)
HCT VFR BLD CALC: 26.3 % — LOW (ref 34.5–45)
HGB BLD-MCNC: 7.4 G/DL — LOW (ref 11.5–15.5)
LDH SERPL L TO P-CCNC: 359 U/L — HIGH (ref 50–242)
MCHC RBC-ENTMCNC: 18.9 PG — LOW (ref 27–34)
MCHC RBC-ENTMCNC: 28.1 GM/DL — LOW (ref 32–36)
MCV RBC AUTO: 67.3 FL — LOW (ref 80–100)
NRBC # BLD: 0 /100 WBCS — SIGNIFICANT CHANGE UP (ref 0–0)
PLATELET # BLD AUTO: 209 K/UL — SIGNIFICANT CHANGE UP (ref 150–400)
POTASSIUM SERPL-MCNC: 3 MMOL/L — LOW (ref 3.5–5.3)
POTASSIUM SERPL-SCNC: 3 MMOL/L — LOW (ref 3.5–5.3)
PROT SERPL-MCNC: 7 G/DL — SIGNIFICANT CHANGE UP (ref 6–8.3)
RBC # BLD: 3.91 M/UL — SIGNIFICANT CHANGE UP (ref 3.8–5.2)
RBC # FLD: 20.8 % — HIGH (ref 10.3–14.5)
SODIUM SERPL-SCNC: 141 MMOL/L — SIGNIFICANT CHANGE UP (ref 135–145)
WBC # BLD: 5.52 K/UL — SIGNIFICANT CHANGE UP (ref 3.8–10.5)
WBC # FLD AUTO: 5.52 K/UL — SIGNIFICANT CHANGE UP (ref 3.8–10.5)

## 2020-04-11 RX ORDER — SODIUM CHLORIDE 0.65 %
1 AEROSOL, SPRAY (ML) NASAL EVERY 4 HOURS
Refills: 0 | Status: DISCONTINUED | OUTPATIENT
Start: 2020-04-11 | End: 2020-04-18

## 2020-04-11 RX ORDER — ONDANSETRON 8 MG/1
4 TABLET, FILM COATED ORAL EVERY 6 HOURS
Refills: 0 | Status: DISCONTINUED | OUTPATIENT
Start: 2020-04-11 | End: 2020-04-18

## 2020-04-11 RX ORDER — POTASSIUM CHLORIDE 20 MEQ
40 PACKET (EA) ORAL EVERY 4 HOURS
Refills: 0 | Status: COMPLETED | OUTPATIENT
Start: 2020-04-11 | End: 2020-04-11

## 2020-04-11 RX ADMIN — Medication 400 MILLIGRAM(S): at 17:51

## 2020-04-11 RX ADMIN — ALBUTEROL 2 PUFF(S): 90 AEROSOL, METERED ORAL at 14:25

## 2020-04-11 RX ADMIN — Medication 40 MILLIEQUIVALENT(S): at 17:53

## 2020-04-11 RX ADMIN — Medication 1 TABLET(S): at 12:00

## 2020-04-11 RX ADMIN — Medication 650 MILLIGRAM(S): at 12:03

## 2020-04-11 RX ADMIN — DULOXETINE HYDROCHLORIDE 60 MILLIGRAM(S): 30 CAPSULE, DELAYED RELEASE ORAL at 12:00

## 2020-04-11 RX ADMIN — ONDANSETRON 4 MILLIGRAM(S): 8 TABLET, FILM COATED ORAL at 06:23

## 2020-04-11 RX ADMIN — Medication 137 MICROGRAM(S): at 06:22

## 2020-04-11 RX ADMIN — ENOXAPARIN SODIUM 40 MILLIGRAM(S): 100 INJECTION SUBCUTANEOUS at 06:23

## 2020-04-11 RX ADMIN — ONDANSETRON 4 MILLIGRAM(S): 8 TABLET, FILM COATED ORAL at 20:42

## 2020-04-11 RX ADMIN — Medication 650 MILLIGRAM(S): at 03:25

## 2020-04-11 RX ADMIN — Medication 10 MILLILITER(S): at 20:42

## 2020-04-11 RX ADMIN — AMLODIPINE BESYLATE 10 MILLIGRAM(S): 2.5 TABLET ORAL at 06:25

## 2020-04-11 RX ADMIN — Medication 100 MILLIGRAM(S): at 06:22

## 2020-04-11 RX ADMIN — ENOXAPARIN SODIUM 40 MILLIGRAM(S): 100 INJECTION SUBCUTANEOUS at 17:51

## 2020-04-11 RX ADMIN — Medication 40 MILLIEQUIVALENT(S): at 16:12

## 2020-04-11 RX ADMIN — Medication 50 MILLIGRAM(S): at 06:22

## 2020-04-11 NOTE — PROGRESS NOTE ADULT - SUBJECTIVE AND OBJECTIVE BOX
44f hx obesity, HTN, anxiety, hypothyroidism presenting with progressive SOB and wheeze x 10 days, was tested positive for covid on 20. Notes no sick contacts or recent travel. 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 +one episodes of nausea and vomiting induced by coughing 3 days ago, +1 episode of watery stool, no further episodes since.     In ED: 102.2, 109, 163/114, 24, 87RA. Given tylenol 975mg po x 1. (10 Apr 2020 16:43)      PAST MEDICAL & SURGICAL HISTORY:  Hypertension in Pregnancy, Preeclampsia, Delivered  Herpes Simplex Type 2 Infection  Acne  Hypothyroidism  HTN - Hypertension  H/O tubal ligation  History of  Section      Review of Systems:   CONSTITUTIONAL: fever,  fatigue  EYES: No eye pain, visual disturbances, or discharge  ENMT:  No difficulty hearing, tinnitus, vertigo; No sinus or throat pain  NECK: No pain or stiffness  BREASTS: No pain, masses, or nipple discharge  RESPIRATORY: cough,  shortness of breath  CARDIOVASCULAR: No chest pain, palpitations, dizziness, or leg swelling  GASTROINTESTINAL: No abdominal or epigastric pain. No nausea, vomiting, or hematemesis; had  diarrhea no constipation. No melena or hematochezia.  GENITOURINARY: No dysuria, frequency, hematuria, or incontinence  NEUROLOGICAL: No headaches, memory loss, loss of strength, numbness, or tremors  SKIN: No itching, burning, rashes, or lesions   LYMPH NODES: No enlarged glands  ENDOCRINE: No heat or cold intolerance; No hair loss  MUSCULOSKELETAL: No joint pain or swelling; No muscle, back, or extremity pain  PSYCHIATRIC: No depression, anxiety, mood swings, or difficulty sleeping  HEME/LYMPH: No easy bruising, or bleeding gums  ALLERY AND IMMUNOLOGIC: No hives or eczema    Allergies    No Known Allergies    Intolerances        Social History:     FAMILY HISTORY:      MEDICATIONS  (STANDING):  amLODIPine   Tablet 10 milliGRAM(s) Oral daily  DULoxetine 60 milliGRAM(s) Oral daily  enoxaparin Injectable 40 milliGRAM(s) SubCutaneous every 12 hours  hydrochlorothiazide 50 milliGRAM(s) Oral daily  hydroxychloroquine   Oral   hydroxychloroquine 400 milliGRAM(s) Oral every 24 hours  levothyroxine 137 MICROGram(s) Oral daily  Nephro-rimma 1 Tablet(s) Oral daily  potassium chloride    Tablet ER 40 milliEquivalent(s) Oral every 4 hours    MEDICATIONS  (PRN):  acetaminophen   Tablet .. 650 milliGRAM(s) Oral every 4 hours PRN Temp greater or equal to 38.5C (101.3F)  ALBUTerol    90 MICROgram(s) HFA Inhaler 2 Puff(s) Inhalation every 4 hours PRN Shortness of Breath and/or Wheezing  benzonatate 100 milliGRAM(s) Oral three times a day PRN Cough  guaifenesin/dextromethorphan  Syrup 10 milliLiter(s) Oral every 4 hours PRN Cough  ondansetron Injectable 4 milliGRAM(s) IV Push every 6 hours PRN Nausea and/or Vomiting  sodium chloride 0.65% Nasal 1 Spray(s) Both Nostrils every 4 hours PRN Nasal Congestion      Vital Signs Last 24 Hrs  T(C): 36.6 (2020 14:21), Max: 39 (10 Apr 2020 16:43)  T(F): 97.8 (2020 14:21), Max: 102.2 (10 Apr 2020 16:43)  HR: 102 (2020 14:21) (97 - 107)  BP: 132/85 (2020 14:21) (123/81 - 138/105)  BP(mean): 116 (10 Apr 2020 16:43) (116 - 116)  RR: 22 (2020 14:21) (22 - 30)  SpO2: 92% (2020 14:21) (90% - 97%)  CAPILLARY BLOOD GLUCOSE        I&O's Summary        LABS:                        7.4    5.52  )-----------( 209      ( 2020 06:56 )             26.3     04-11    141  |  102  |  8   ----------------------------<  91  3.0<L>   |  25  |  0.86    Ca    8.6      2020 06:56    TPro  7.0  /  Alb  3.6  /  TBili  0.3  /  DBili  x   /  AST  32  /  ALT  20  /  AlkPhos  68  04-11      CARDIAC MARKERS ( 2020 06:56 )  x     / x     / 263 U/L / x     / x      CARDIAC MARKERS ( 10 Apr 2020 10:48 )  x     / x     / 266 U/L / x     / x              RADIOLOGY & ADDITIONAL TESTS:    Imaging Personally Reviewed:    Consultant(s) Notes Reviewed:      Care Discussed with Consultants/Other Providers:

## 2020-04-11 NOTE — PROGRESS NOTE ADULT - ASSESSMENT
44f hx obestiy, HTN, anxiety, hypothyroidism presenting with sepsis 2/2 covid19 diagnosed via OP swab on 4/6/20     Problem/Plan - 1:  ·  Problem: Pneumonia due to 2019 novel coronavirus.  Plan: with hypoxia to 87% on RA, improved to 97% on 2L NC  - start plaquenil 5 day protocol, qtc < 500  - o2 supplementation as needed- if escalates to 100% NRB, consider solu-medrol 1-1.5mg/kg iv daily in two divided doses x 3-5 days  - avoid HFNC/bilevel- if needs o2 beyond NRB, would prone patient and call MICU consult for intubation  - ID consult  for consideration in regeneron trial-   - trend q3 day cbc w/diff, CMP, d-dimer, ferritin, CRP, CK, LDH.      Problem/Plan - 2:  ·  Problem: Hypothyroidism.  Plan: continue synthroid.      Problem/Plan - 3:  ·  Problem: Hypertension.  Plan: Pt with diastolic HTN on presentation  - continue hctz 50mg po daily with hold parameters  - will uptitrate amlodipine to 10mg po daily with hold parameters.

## 2020-04-12 LAB
ANION GAP SERPL CALC-SCNC: 9 MMOL/L — SIGNIFICANT CHANGE UP (ref 5–17)
BUN SERPL-MCNC: 12 MG/DL — SIGNIFICANT CHANGE UP (ref 7–23)
CALCIUM SERPL-MCNC: 9.1 MG/DL — SIGNIFICANT CHANGE UP (ref 8.4–10.5)
CHLORIDE SERPL-SCNC: 101 MMOL/L — SIGNIFICANT CHANGE UP (ref 96–108)
CO2 SERPL-SCNC: 29 MMOL/L — SIGNIFICANT CHANGE UP (ref 22–31)
CREAT SERPL-MCNC: 1.08 MG/DL — SIGNIFICANT CHANGE UP (ref 0.5–1.3)
GLUCOSE SERPL-MCNC: 95 MG/DL — SIGNIFICANT CHANGE UP (ref 70–99)
MAGNESIUM SERPL-MCNC: 2.4 MG/DL — SIGNIFICANT CHANGE UP (ref 1.6–2.6)
POTASSIUM SERPL-MCNC: 3.8 MMOL/L — SIGNIFICANT CHANGE UP (ref 3.5–5.3)
POTASSIUM SERPL-SCNC: 3.8 MMOL/L — SIGNIFICANT CHANGE UP (ref 3.5–5.3)
SODIUM SERPL-SCNC: 139 MMOL/L — SIGNIFICANT CHANGE UP (ref 135–145)

## 2020-04-12 RX ADMIN — ENOXAPARIN SODIUM 40 MILLIGRAM(S): 100 INJECTION SUBCUTANEOUS at 04:11

## 2020-04-12 RX ADMIN — Medication 10 MILLILITER(S): at 22:07

## 2020-04-12 RX ADMIN — Medication 1 SPRAY(S): at 04:11

## 2020-04-12 RX ADMIN — Medication 40 MILLIGRAM(S): at 04:51

## 2020-04-12 RX ADMIN — Medication 10 MILLILITER(S): at 04:10

## 2020-04-12 RX ADMIN — Medication 137 MICROGRAM(S): at 04:10

## 2020-04-12 RX ADMIN — Medication 1 TABLET(S): at 13:34

## 2020-04-12 RX ADMIN — Medication 50 MILLIGRAM(S): at 04:10

## 2020-04-12 RX ADMIN — DULOXETINE HYDROCHLORIDE 60 MILLIGRAM(S): 30 CAPSULE, DELAYED RELEASE ORAL at 13:34

## 2020-04-12 RX ADMIN — Medication 400 MILLIGRAM(S): at 17:41

## 2020-04-12 RX ADMIN — ALBUTEROL 2 PUFF(S): 90 AEROSOL, METERED ORAL at 04:11

## 2020-04-12 RX ADMIN — AMLODIPINE BESYLATE 10 MILLIGRAM(S): 2.5 TABLET ORAL at 04:10

## 2020-04-12 RX ADMIN — ENOXAPARIN SODIUM 40 MILLIGRAM(S): 100 INJECTION SUBCUTANEOUS at 17:41

## 2020-04-12 NOTE — PROGRESS NOTE ADULT - SUBJECTIVE AND OBJECTIVE BOX
Patient is a 44y old  Female who presents with a chief complaint of shortness of breath (11 Apr 2020 16:23)      SUBJECTIVE / OVERNIGHT EVENTS:  mild sob.  on NRB mask. saturation above 91%    MEDICATIONS  (STANDING):  amLODIPine   Tablet 10 milliGRAM(s) Oral daily  DULoxetine 60 milliGRAM(s) Oral daily  enoxaparin Injectable 40 milliGRAM(s) SubCutaneous every 12 hours  hydrochlorothiazide 50 milliGRAM(s) Oral daily  hydroxychloroquine   Oral   hydroxychloroquine 400 milliGRAM(s) Oral every 24 hours  levothyroxine 137 MICROGram(s) Oral daily  Nephro-rimma 1 Tablet(s) Oral daily    MEDICATIONS  (PRN):  acetaminophen   Tablet .. 650 milliGRAM(s) Oral every 4 hours PRN Temp greater or equal to 38.5C (101.3F)  ALBUTerol    90 MICROgram(s) HFA Inhaler 2 Puff(s) Inhalation every 4 hours PRN Shortness of Breath and/or Wheezing  benzonatate 100 milliGRAM(s) Oral three times a day PRN Cough  guaifenesin/dextromethorphan  Syrup 10 milliLiter(s) Oral every 4 hours PRN Cough  ondansetron Injectable 4 milliGRAM(s) IV Push every 6 hours PRN Nausea and/or Vomiting  sodium chloride 0.65% Nasal 1 Spray(s) Both Nostrils every 4 hours PRN Nasal Congestion      Vital Signs Last 24 Hrs  T(C): 36.6 (12 Apr 2020 12:09), Max: 37.2 (11 Apr 2020 20:48)  T(F): 97.8 (12 Apr 2020 12:09), Max: 99 (11 Apr 2020 20:48)  HR: 105 (12 Apr 2020 12:09) (92 - 105)  BP: 114/76 (12 Apr 2020 12:09) (111/80 - 132/85)  BP(mean): --  RR: 22 (12 Apr 2020 12:09) (22 - 22)  SpO2: 91% (12 Apr 2020 12:09) (91% - 92%)  CAPILLARY BLOOD GLUCOSE        I&O's Summary        LABS:                        7.4    5.52  )-----------( 209      ( 11 Apr 2020 06:56 )             26.3     04-12    139  |  101  |  12  ----------------------------<  95  3.8   |  29  |  1.08    Ca    9.1      12 Apr 2020 07:41  Mg     2.4     04-12    TPro  7.0  /  Alb  3.6  /  TBili  0.3  /  DBili  x   /  AST  32  /  ALT  20  /  AlkPhos  68  04-11      CARDIAC MARKERS ( 11 Apr 2020 06:56 )  x     / x     / 263 U/L / x     / x              RADIOLOGY & ADDITIONAL TESTS:    Imaging Personally Reviewed:    Consultant(s) Notes Reviewed:      Care Discussed with Consultants/Other Providers:

## 2020-04-12 NOTE — PROGRESS NOTE ADULT - ASSESSMENT
44f hx obestiy, HTN, anxiety, hypothyroidism presenting with sepsis 2/2 covid19 diagnosed via OP swab on 4/6/20     Problem/Plan - 1:  ·  Problem: Pneumonia due to 2019 novel coronavirus.  Plan: with hypoxia to 87% on RA, improved with supplemental O2  - plaquenil 5 day protocol, qtc < 500  - o2 supplementation as needed-  consider solu-medrol 1-1.5mg/kg iv daily in two divided doses x 3-5 days  - avoid HFNC/bilevel- if needs o2 beyond NRB, would prone patient and call MICU consult for intubation  - per ID consult no other meds at this time  - trend q3 day cbc w/diff, CMP, d-dimer, ferritin, CRP, CK, LDH.      Problem/Plan - 2:  ·  Problem: Hypothyroidism.  Plan: continue synthroid.      Problem/Plan - 3:  ·  Problem: Hypertension.  Plan: Pt with diastolic HTN on presentation  - continue hctz 50mg po daily with hold parameters  - will uptitrate amlodipine to 10mg po daily with hold parameters.

## 2020-04-12 NOTE — CHART NOTE - NSCHARTNOTEFT_GEN_A_CORE
TERI FAIRCHILD    Notified by RN patient O2 sat drop <90% on O2 8L via NC.       Interventions taken   Albuterol inh prn  Solumedrol 40mg iv x1  Increase O2 15L via NRM to keep O2 sat >92%  Prone positioning  cont assess and monitor  f/u with am team.                        Melania Browning ANP-BC  Spectralink #[id number]

## 2020-04-12 NOTE — CHART NOTE - NSCHARTNOTEFT_GEN_A_CORE
Spoke to house ID in regards to this Pt, given that oxygen demand increased from NC 8L to NRB mask 12L. Pt currently on Plaquenil with taper to be finished on 4/14/20. Per ID: c/w Plaquenil for now, at this time no need for anakinra given that inflammatory markers are either low or wnl. If oxygen demand increases overnight Pt can be placed on Solumedrol. Repeat inflammatory markers in AM and monitor clinical course.     Dr. Shaffer made aware of recs    Akiko Lorenzo PA-C

## 2020-04-13 LAB
CRP SERPL-MCNC: 4.07 MG/DL — HIGH (ref 0–0.4)
D DIMER BLD IA.RAPID-MCNC: <150 NG/ML DDU — SIGNIFICANT CHANGE UP
FERRITIN SERPL-MCNC: 41 NG/ML — SIGNIFICANT CHANGE UP (ref 15–150)
HCT VFR BLD CALC: 26.5 % — LOW (ref 34.5–45)
HGB BLD-MCNC: 7.6 G/DL — LOW (ref 11.5–15.5)
LDH SERPL L TO P-CCNC: 341 U/L — HIGH (ref 50–242)
MCHC RBC-ENTMCNC: 19 PG — LOW (ref 27–34)
MCHC RBC-ENTMCNC: 28.7 GM/DL — LOW (ref 32–36)
MCV RBC AUTO: 66.1 FL — LOW (ref 80–100)
NRBC # BLD: 0 /100 WBCS — SIGNIFICANT CHANGE UP (ref 0–0)
PLATELET # BLD AUTO: 325 K/UL — SIGNIFICANT CHANGE UP (ref 150–400)
RBC # BLD: 4.01 M/UL — SIGNIFICANT CHANGE UP (ref 3.8–5.2)
RBC # FLD: 21.2 % — HIGH (ref 10.3–14.5)
WBC # BLD: 6.21 K/UL — SIGNIFICANT CHANGE UP (ref 3.8–10.5)
WBC # FLD AUTO: 6.21 K/UL — SIGNIFICANT CHANGE UP (ref 3.8–10.5)

## 2020-04-13 RX ADMIN — DULOXETINE HYDROCHLORIDE 60 MILLIGRAM(S): 30 CAPSULE, DELAYED RELEASE ORAL at 12:30

## 2020-04-13 RX ADMIN — Medication 137 MICROGRAM(S): at 05:12

## 2020-04-13 RX ADMIN — Medication 400 MILLIGRAM(S): at 17:08

## 2020-04-13 RX ADMIN — Medication 1 TABLET(S): at 12:30

## 2020-04-13 RX ADMIN — ENOXAPARIN SODIUM 40 MILLIGRAM(S): 100 INJECTION SUBCUTANEOUS at 05:12

## 2020-04-13 RX ADMIN — Medication 50 MILLIGRAM(S): at 05:12

## 2020-04-13 RX ADMIN — ALBUTEROL 2 PUFF(S): 90 AEROSOL, METERED ORAL at 11:33

## 2020-04-13 RX ADMIN — ENOXAPARIN SODIUM 40 MILLIGRAM(S): 100 INJECTION SUBCUTANEOUS at 17:08

## 2020-04-13 RX ADMIN — AMLODIPINE BESYLATE 10 MILLIGRAM(S): 2.5 TABLET ORAL at 05:12

## 2020-04-13 NOTE — PROGRESS NOTE ADULT - ASSESSMENT
44f hx obestiy, HTN, anxiety, hypothyroidism presenting with sepsis 2/2 covid19 diagnosed via OP swab on 4/6/20     Problem/Plan - 1:  ·  Problem: Pneumonia due to 2019 novel coronavirus.  Plan: with hypoxia to 87% on RA, improved with supplemental O2  - plaquenil 5 day protocol, qtc < 500  - o2 supplementation as needed-  consider solu-medrol 1-1.5mg/kg iv daily in two divided doses x 3-5 days  - avoid HFNC/bilevel- if needs o2 beyond NRB, would prone patient and call MICU consult for intubation  - per ID consult no other meds at this time  - trend q3 day cbc w/diff, CMP, d-dimer, ferritin, CRP, CK, LDH.   _ ID consult called      Problem/Plan - 2:  ·  Problem: Hypothyroidism.  Plan: continue synthroid.      Problem/Plan - 3:  ·  Problem: Hypertension.  Plan: Pt with diastolic HTN on presentation  - continue hctz 50mg po daily with hold parameters  - will uptitrate amlodipine to 10mg po daily with hold parameters.

## 2020-04-13 NOTE — PROGRESS NOTE ADULT - SUBJECTIVE AND OBJECTIVE BOX
Patient is a 44y old  Female who presents with a chief complaint of shortness of breath (12 Apr 2020 13:28)      SUBJECTIVE / OVERNIGHT EVENTS:  pt c/o sob and manriquez,  on 100% FM    MEDICATIONS  (STANDING):  amLODIPine   Tablet 10 milliGRAM(s) Oral daily  DULoxetine 60 milliGRAM(s) Oral daily  enoxaparin Injectable 40 milliGRAM(s) SubCutaneous every 12 hours  hydrochlorothiazide 50 milliGRAM(s) Oral daily  hydroxychloroquine   Oral   hydroxychloroquine 400 milliGRAM(s) Oral every 24 hours  levothyroxine 137 MICROGram(s) Oral daily  Nephro-rimma 1 Tablet(s) Oral daily    MEDICATIONS  (PRN):  acetaminophen   Tablet .. 650 milliGRAM(s) Oral every 4 hours PRN Temp greater or equal to 38.5C (101.3F)  ALBUTerol    90 MICROgram(s) HFA Inhaler 2 Puff(s) Inhalation every 4 hours PRN Shortness of Breath and/or Wheezing  benzonatate 100 milliGRAM(s) Oral three times a day PRN Cough  guaifenesin/dextromethorphan  Syrup 10 milliLiter(s) Oral every 4 hours PRN Cough  ondansetron Injectable 4 milliGRAM(s) IV Push every 6 hours PRN Nausea and/or Vomiting  sodium chloride 0.65% Nasal 1 Spray(s) Both Nostrils every 4 hours PRN Nasal Congestion      Vital Signs Last 24 Hrs  T(C): 36.6 (13 Apr 2020 05:10), Max: 36.8 (12 Apr 2020 18:26)  T(F): 97.9 (13 Apr 2020 05:10), Max: 98.2 (12 Apr 2020 18:26)  HR: 99 (13 Apr 2020 05:10) (99 - 106)  BP: 111/82 (13 Apr 2020 05:10) (111/82 - 121/83)  BP(mean): --  RR: 20 (13 Apr 2020 05:10) (20 - 20)  SpO2: 91% (13 Apr 2020 05:10) (91% - 93%)  CAPILLARY BLOOD GLUCOSE        I&O's Summary        LABS:                        7.6    6.21  )-----------( 325      ( 13 Apr 2020 06:56 )             26.5     04-12    139  |  101  |  12  ----------------------------<  95  3.8   |  29  |  1.08    Ca    9.1      12 Apr 2020 07:41  Mg     2.4     04-12                RADIOLOGY & ADDITIONAL TESTS:    Imaging Personally Reviewed:    Consultant(s) Notes Reviewed:      Care Discussed with Consultants/Other Providers:

## 2020-04-14 PROCEDURE — 99223 1ST HOSP IP/OBS HIGH 75: CPT

## 2020-04-14 RX ADMIN — DULOXETINE HYDROCHLORIDE 60 MILLIGRAM(S): 30 CAPSULE, DELAYED RELEASE ORAL at 13:02

## 2020-04-14 RX ADMIN — Medication 137 MICROGRAM(S): at 05:04

## 2020-04-14 RX ADMIN — Medication 1 TABLET(S): at 13:03

## 2020-04-14 RX ADMIN — ENOXAPARIN SODIUM 40 MILLIGRAM(S): 100 INJECTION SUBCUTANEOUS at 05:04

## 2020-04-14 RX ADMIN — AMLODIPINE BESYLATE 10 MILLIGRAM(S): 2.5 TABLET ORAL at 05:04

## 2020-04-14 RX ADMIN — ENOXAPARIN SODIUM 40 MILLIGRAM(S): 100 INJECTION SUBCUTANEOUS at 16:20

## 2020-04-14 RX ADMIN — ALBUTEROL 2 PUFF(S): 90 AEROSOL, METERED ORAL at 07:10

## 2020-04-14 RX ADMIN — Medication 400 MILLIGRAM(S): at 16:20

## 2020-04-14 RX ADMIN — Medication 50 MILLIGRAM(S): at 05:04

## 2020-04-14 NOTE — CONSULT NOTE ADULT - SUBJECTIVE AND OBJECTIVE BOX
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   She became more short of breath with coughing and was admitted 4/10/20.   She is saturating well on nonrebreather currently. No chest pain but gets very short of breath with just walking to the bathroom. Cannot take a full deep breath without coughing.   Her fevers and chills stopped.   No diarrhea or abdominal pain. No dysuria.     PAST MEDICAL & SURGICAL HISTORY:  Hypertension in Pregnancy, Preeclampsia, Delivered  Herpes Simplex Type 2 Infection  Acne  Hypothyroidism  HTN - Hypertension  H/O tubal ligation  History of  Section      Allergies    No Known Allergies    Intolerances    ANTIMICROBIALS:  hydroxychloroquine    hydroxychloroquine 400 every 24 hours      OTHER MEDS:  acetaminophen   Tablet .. 650 milliGRAM(s) Oral every 4 hours PRN  ALBUTerol    90 MICROgram(s) HFA Inhaler 2 Puff(s) Inhalation every 4 hours PRN  amLODIPine   Tablet 10 milliGRAM(s) Oral daily  benzonatate 100 milliGRAM(s) Oral three times a day PRN  DULoxetine 60 milliGRAM(s) Oral daily  enoxaparin Injectable 40 milliGRAM(s) SubCutaneous every 12 hours  guaifenesin/dextromethorphan  Syrup 10 milliLiter(s) Oral every 4 hours PRN  hydrochlorothiazide 50 milliGRAM(s) Oral daily  levothyroxine 137 MICROGram(s) Oral daily  Nephro-rimma 1 Tablet(s) Oral daily  ondansetron Injectable 4 milliGRAM(s) IV Push every 6 hours PRN  sodium chloride 0.65% Nasal 1 Spray(s) Both Nostrils every 4 hours PRN    SOCIAL HISTORY: Nonsmoker. Social alcohol. Works as an RN at [employer].     FAMILY HISTORY:  DM on Father's side  HTN on Mother's side     ROS:  All other systems negative   Constitutional: fever and chills resolved  Eye: no vision changes  ENT: no sore throat  Cardiovascular: no chest pain, no palpitation  Respiratory: SOB, cough  GI: no abd pain, no vomiting, no diarrhea  urinary: no dysuria, no hematuria, no flank pain  musculoskeletal: no joint pain, no joint swelling  skin: no rash  neurology: no headache, no change in mental status  psych: no anxiety    Physical Exam:  General: awake, alert, non toxic  Head: atraumatic, normocephalic  Eyes: normal sclera and conjunctiva  ENT: no LAD, neck supple  Cardio: regular rate and rhythm   Respiratory: nonlabored on nonrebreather. grossly clear bilaterally but limited by inspiratory effort. no wheezing  abd: soft, bowel sounds present, not tender  : no payton  Musculoskeletal: no edema  Skin: no rash  vascular: no phlebitis  Neurologic: no focal deficits  psych: normal affect       Drug Dosing Weight  Height (cm): 160.02 (10 Apr 2020 09:36)  Weight (kg): 77.1 (10 Apr 2020 09:36)  BMI (kg/m2): 30.1 (10 Apr 2020 09:36)  BSA (m2): 1.8 (10 Apr 2020 09:36)    Vital Signs Last 24 Hrs  T(F): 97.8 (20 @ 11:54), Max: 102.2 (04-10-20 @ 09:36)    Vital Signs Last 24 Hrs  HR: 97 (20 @ 11:54) (90 - 100)  BP: 103/72 (20 @ 11:54) (103/72 - 116/71)  RR: 18 (20 @ 11:54)  SpO2: 95% (20 @ 11:54) (89% - 97%)  Wt(kg): --                          7.6    6.21  )-----------( 325      ( 2020 06:56 )             26.5         MICROBIOLOGY:    RADIOLOGY:  Images below reviewed personally  Xray Chest 1 View- PORTABLE-Urgent (04.10.20 @ 11:03)   Bilateral patchy consolidations, most pronounced in the lower lobes, concerning for viral pneumonia such as COVID.

## 2020-04-14 NOTE — PROGRESS NOTE ADULT - SUBJECTIVE AND OBJECTIVE BOX
Patient is a 44y old  Female who presents with a chief complaint of shortness of breath (13 Apr 2020 15:42)      SUBJECTIVE / OVERNIGHT EVENTS:  on 100% nrb.  no cp no sob    MEDICATIONS  (STANDING):  amLODIPine   Tablet 10 milliGRAM(s) Oral daily  DULoxetine 60 milliGRAM(s) Oral daily  enoxaparin Injectable 40 milliGRAM(s) SubCutaneous every 12 hours  hydrochlorothiazide 50 milliGRAM(s) Oral daily  hydroxychloroquine   Oral   hydroxychloroquine 400 milliGRAM(s) Oral every 24 hours  levothyroxine 137 MICROGram(s) Oral daily  Nephro-rimma 1 Tablet(s) Oral daily    MEDICATIONS  (PRN):  acetaminophen   Tablet .. 650 milliGRAM(s) Oral every 4 hours PRN Temp greater or equal to 38.5C (101.3F)  ALBUTerol    90 MICROgram(s) HFA Inhaler 2 Puff(s) Inhalation every 4 hours PRN Shortness of Breath and/or Wheezing  benzonatate 100 milliGRAM(s) Oral three times a day PRN Cough  guaifenesin/dextromethorphan  Syrup 10 milliLiter(s) Oral every 4 hours PRN Cough  ondansetron Injectable 4 milliGRAM(s) IV Push every 6 hours PRN Nausea and/or Vomiting  sodium chloride 0.65% Nasal 1 Spray(s) Both Nostrils every 4 hours PRN Nasal Congestion      Vital Signs Last 24 Hrs  T(C): 36.6 (14 Apr 2020 11:54), Max: 36.8 (13 Apr 2020 16:26)  T(F): 97.8 (14 Apr 2020 11:54), Max: 98.2 (13 Apr 2020 16:26)  HR: 97 (14 Apr 2020 11:54) (90 - 100)  BP: 103/72 (14 Apr 2020 11:54) (103/72 - 116/71)  BP(mean): --  RR: 18 (14 Apr 2020 11:54) (18 - 20)  SpO2: 95% (14 Apr 2020 11:54) (89% - 97%)  CAPILLARY BLOOD GLUCOSE        I&O's Summary        LABS:                        7.6    6.21  )-----------( 325      ( 13 Apr 2020 06:56 )             26.5                     RADIOLOGY & ADDITIONAL TESTS:    Imaging Personally Reviewed:    Consultant(s) Notes Reviewed:      Care Discussed with Consultants/Other Providers:

## 2020-04-14 NOTE — CHART NOTE - NSCHARTNOTEFT_GEN_A_CORE
Nutrition Initial Assessment    Nutrition Consult Received: Yes [   ]  No [ x ]    Reason for Initial Nutrition Assessment: Seen for LOS [id number]    Source of Information: Unable to conduct a fact to face interview due to COVID-19 isolation policy. Information obtained from a phone call with the pt and from the EMR.    Admitting Diagnosis: 44y Female admitted for Hypoxia    PAST MEDICAL & SURGICAL HISTORY:  Hypertension in Pregnancy, Preeclampsia, Delivered  Herpes Simplex Type 2 Infection  Acne  Hypothyroidism  HTN - Hypertension  H/O tubal ligation  History of  Section    Subjective Information: Pt currently reports improving po intake/appetite, able to eat most of her meals. Admits appetite has been decreased ~1 week prior 2/2 viral illness. States she may have lost weight but is uncertain, states UBW is 170 pounds which is consistent with admission weight. Denies GI distress, no N/V/diarrhea at this time. Denies chewing/swallowing difficulties. Took a MVI PTA, Denies food allergies or intolerance. Pt voiced food preferences and declines oral supplements at this time as she feels her po intake is sufficient.    GI Issues: None at this time reported by pt    Current Nutrition Order: Diet, DASH/TLC:   Sodium & Cholesterol Restricted (04-10-20 @ 15:21)    PO Intake:   Good (%) [ x ]    Fair (50-75%) [   ]    Poor (<50%) [   ]    Skin Integrity: No pressure injuries noted, no edema noted per flowsheet records    Labs: [facility]    Medications:  MEDICATIONS  (STANDING):  amLODIPine   Tablet 10 milliGRAM(s) Oral daily  DULoxetine 60 milliGRAM(s) Oral daily  enoxaparin Injectable 40 milliGRAM(s) SubCutaneous every 12 hours  hydrochlorothiazide 50 milliGRAM(s) Oral daily  hydroxychloroquine   Oral   hydroxychloroquine 400 milliGRAM(s) Oral every 24 hours  levothyroxine 137 MICROGram(s) Oral daily  Nephro-rimma 1 Tablet(s) Oral daily    MEDICATIONS  (PRN):  acetaminophen   Tablet .. 650 milliGRAM(s) Oral every 4 hours PRN Temp greater or equal to 38.5C (101.3F)  ALBUTerol    90 MICROgram(s) HFA Inhaler 2 Puff(s) Inhalation every 4 hours PRN Shortness of Breath and/or Wheezing  benzonatate 100 milliGRAM(s) Oral three times a day PRN Cough  guaifenesin/dextromethorphan  Syrup 10 milliLiter(s) Oral every 4 hours PRN Cough  ondansetron Injectable 4 milliGRAM(s) IV Push every 6 hours PRN Nausea and/or Vomiting  sodium chloride 0.65% Nasal 1 Spray(s) Both Nostrils every 4 hours PRN Nasal Congestion    Admitted Anthropometrics:    Height (cm): 160.02 (04-10-20 @ 09:36)  Weight (kg): 77.1 (04-10-20 @ 09:36)  BMI (kg/m2): 30.1 (04-10-20 @ 09:36)    Nutrition Focused Physical Exam: Unable to complete due to isolation policy    Estimated Energy Needs (_25_ kcal/kg- _30_ kcal/kg): 1450 - 1740 kcal  Estimated Protein Needs (_1.2_ g/kg- _1.4_ g/kg): 70 - 81 grams  Based on weight of: 58 kG upper IBW    [ x ] Nutrition Diagnosis: Overweight/obesity related to kcal intake in excess of needs as evidenced by BMI 30.1 kG/m2  [  ] No active nutrition diagnosis at this time  [  ] Current medical condition precludes nutrition intervention    Goal: Pt will lose weight towards IBW range post-recovery from viral illness    Nutrition Interventions: Recommend continue current diet order to promote cardiovascular health. Food preferences obtained and honored on menu. Pt declines oral supplements at this time 2/2 improved po intake. Monitor po intake and hospital course for additional interventions as deemed necessary.    RD to follow-up per protocol: Desiree Samuels MS, RDN, CDN, CDE, CSOWM. #[phone]

## 2020-04-14 NOTE — CONSULT NOTE ADULT - ASSESSMENT
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   Requiring nonrebreather but saturating well today 95-97% and looks comfortable.   Only CRP is elevated and not by much, ferritin and D-dimer are normal.     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   Requiring nonrebreather but saturating well today 95-97% and looks comfortable.   Only CRP is elevated and not by much, ferritin and D-dimer are normal.   Not a candidate for Remdesivir trial. Other trials are being conducted by other departments but she is not interested currently.     Suggest  -complete Plaquenil - day 5, no role for extending the course given its long half life   -supportive care   -trend labs - I ordered ferritin, CRP and D dimer for tomorrow   -if becomes hypoxic while on nonrebreather and inflammatory markers are increasing can consider anakinra and/or steroids per protocol     Spoke with primary team     Mukesh Nagy MD   Infectious Disease   Pager [phone]   After 5PM and on weekends please page fellow on call or call [phone]

## 2020-04-15 LAB
CRP SERPL-MCNC: 2.58 MG/DL — HIGH (ref 0–0.4)
D DIMER BLD IA.RAPID-MCNC: <150 NG/ML DDU — SIGNIFICANT CHANGE UP
FERRITIN SERPL-MCNC: 36 NG/ML — SIGNIFICANT CHANGE UP (ref 15–150)

## 2020-04-15 PROCEDURE — 71045 X-RAY EXAM CHEST 1 VIEW: CPT | Mod: 26

## 2020-04-15 PROCEDURE — 99232 SBSQ HOSP IP/OBS MODERATE 35: CPT

## 2020-04-15 RX ADMIN — AMLODIPINE BESYLATE 10 MILLIGRAM(S): 2.5 TABLET ORAL at 05:33

## 2020-04-15 RX ADMIN — Medication 50 MILLIGRAM(S): at 05:33

## 2020-04-15 RX ADMIN — Medication 137 MICROGRAM(S): at 05:34

## 2020-04-15 RX ADMIN — Medication 1 TABLET(S): at 12:21

## 2020-04-15 RX ADMIN — DULOXETINE HYDROCHLORIDE 60 MILLIGRAM(S): 30 CAPSULE, DELAYED RELEASE ORAL at 12:21

## 2020-04-15 RX ADMIN — ENOXAPARIN SODIUM 40 MILLIGRAM(S): 100 INJECTION SUBCUTANEOUS at 05:34

## 2020-04-15 RX ADMIN — ENOXAPARIN SODIUM 40 MILLIGRAM(S): 100 INJECTION SUBCUTANEOUS at 17:01

## 2020-04-15 NOTE — PROGRESS NOTE ADULT - SUBJECTIVE AND OBJECTIVE BOX
Follow Up:      Interval History/ROS:     Allergies  No Known Allergies        ANTIMICROBIALS:      OTHER MEDS:  acetaminophen   Tablet .. 650 milliGRAM(s) Oral every 4 hours PRN  ALBUTerol    90 MICROgram(s) HFA Inhaler 2 Puff(s) Inhalation every 4 hours PRN  amLODIPine   Tablet 10 milliGRAM(s) Oral daily  benzonatate 100 milliGRAM(s) Oral three times a day PRN  DULoxetine 60 milliGRAM(s) Oral daily  enoxaparin Injectable 40 milliGRAM(s) SubCutaneous every 12 hours  guaifenesin/dextromethorphan  Syrup 10 milliLiter(s) Oral every 4 hours PRN  hydrochlorothiazide 50 milliGRAM(s) Oral daily  levothyroxine 137 MICROGram(s) Oral daily  Nephro-rimma 1 Tablet(s) Oral daily  ondansetron Injectable 4 milliGRAM(s) IV Push every 6 hours PRN  sodium chloride 0.65% Nasal 1 Spray(s) Both Nostrils every 4 hours PRN      Vital Signs Last 24 Hrs  T(C): 36.9 (15 Apr 2020 03:20), Max: 36.9 (15 Apr 2020 03:20)  T(F): 98.4 (15 Apr 2020 03:20), Max: 98.4 (15 Apr 2020 03:20)  HR: 94 (15 Apr 2020 03:20) (94 - 97)  BP: 127/89 (15 Apr 2020 03:20) (108/75 - 127/89)  BP(mean): --  RR: 18 (15 Apr 2020 03:20) (18 - 18)  SpO2: 94% (15 Apr 2020 03:20) (92% - 94%)    Physical Exam:  General: awake, alert, non toxic  Head: atraumatic, normocephalic  Eye: normal sclera and conjunctiva  Cardio: regular rate and rhythm   Respiratory: nonlabored on nonrebreather, grossly clear bilaterally, no wheezing  abd: soft, bowel sounds present, no tenderness  Neurologic: no focal deficit  psych: normal affect    MICROBIOLOGY:      RADIOLOGY:  Images below reviewed personally  Xray Chest 1 View- PORTABLE-Urgent (04.10.20 @ 11:03)   Bilateralpatchy consolidations, most pronounced in the lower lobes, concerning for viral pneumonia such as COVID.

## 2020-04-15 NOTE — CONSULT NOTE ADULT - SUBJECTIVE AND OBJECTIVE BOX
[facility] - [facility] - CONSULT NOTE    HPI: 44 year old gentlewoman, lifelong non-smoker, with no known intrinsic lung disease. The patient works in [employer] with multiple exposures to clients with COVID-19. The patient began to feel unwell in early April with fevers, chills, sweats, cough, fatigue, malaise weakness and anorexia. She was found to have COVID-19 on 2020. She attempted to convalesce at home, but developed worsening cough without sputum production, chest congestion or wheeze. She developed chest tightness and shortness of breath with minimal exertion. She has required 100% supplemental oxygen via a NRB for some time. Otherwise, she is clinically much improved. Asked to evaluate    PMHX:  HTN - Hypertension  Hypertension in Pregnancy  Herpes Simplex Type 2 Infection  Acne  Depression  Hypothyroidism    PSHX:  Tubal ligation   Section      FAMILY HISTORY:  no pertinent past medical history in first degree relatives      SOCIAL HISTORY:  works at [employer]; non-smoker    Pulmonary Medications:       Antimicrobials:      Cardiology:  amLODIPine   Tablet 10 milliGRAM(s) Oral daily  hydrochlorothiazide 50 milliGRAM(s) Oral daily      Other:  DULoxetine 60 milliGRAM(s) Oral daily  enoxaparin Injectable 40 milliGRAM(s) SubCutaneous every 12 hours  levothyroxine 137 MICROGram(s) Oral daily  Nephro-rimma 1 Tablet(s) Oral daily      Prn:  MEDICATIONS  (PRN):  acetaminophen   Tablet .. 650 milliGRAM(s) Oral every 4 hours PRN Temp greater or equal to 38.5C (101.3F)  ALBUTerol    90 MICROgram(s) HFA Inhaler 2 Puff(s) Inhalation every 4 hours PRN Shortness of Breath and/or Wheezing  benzonatate 100 milliGRAM(s) Oral three times a day PRN Cough  guaifenesin/dextromethorphan  Syrup 10 milliLiter(s) Oral every 4 hours PRN Cough  ondansetron Injectable 4 milliGRAM(s) IV Push every 6 hours PRN Nausea and/or Vomiting  sodium chloride 0.65% Nasal 1 Spray(s) Both Nostrils every 4 hours PRN Nasal Congestion      Allergies    No Known Allergies    HOME MEDICATIONS: see  H & P    REVIEW OF SYSTEMS:  Constitutional: As per HPI  HEENT: Within normal limits  CV: As per HPI  Resp: As per HPI  GI: Within normal limits   : Within normal limits  Musculoskeletal: Within normal limits  Skin: Within normal limits  Neurological: Within normal limits  Psychiatric: Within normal limits  Endocrine: Within normal limits  Hematologic/Lymphatic: Within normal limits  Allergic/Immunologic: Within normal limits    [x] All other systems negative    OBJECTIVE:      PHYSICAL EXAM:  ICU Vital Signs Last 24 Hrs  T(C): 36.9 (15 Apr 2020 03:20), Max: 36.9 (15 Apr 2020 03:20)  T(F): 98.4 (15 Apr 2020 03:20), Max: 98.4 (15 Apr 2020 03:20)  HR: 94 (15 Apr 2020 03:20) (94 - 97)  BP: 127/89 (15 Apr 2020 03:20) (108/75 - 127/89)  BP(mean): --  ABP: --  ABP(mean): --  RR: 18 (15 Apr 2020 03:20) (18 - 18)  SpO2: 94% (15 Apr 2020 03:20) (92% - 94%) on 100% NRB -> 88% on 6lpm nasal canula    General: Awake. Alert. Cooperative. No distress. Appears stated age 	  HEENT:   Atraumatic. Normocephalic. Anicteric. Normal oral mucosa. PERRL. EOMI.  Neck: Supple. Trachea midline. Thyroid without enlargement/tenderness/nodules. No carotid bruit. No JVD.	  Cardiovascular: Regular rate and rhythm. S1 S2 normal. No murmurs, rubs or gallops.  Respiratory: Respirations unlabored. Bibasilar rales. No curvature.  Abdomen: Soft. Non-tender. Non-distended. No organomegaly. No masses. Normal bowel sounds.  Extremities: Warm to touch. No clubbing or cyanosis. No pedal edema.  Pulses: 2+ peripheral pulses all extremities.	  Skin: Normal skin color. No rashes or lesions. No ecchymoses. No cyanosis. Warm to touch.  Lymph Nodes: Cervical, supraclavicular and axillary nodes normal  Neurological: Motor and sensory examination equal and normal. A and O x 3  Psychiatry: Appropriate mood and affect.      LABS:      CBC    WBC  6.21 <==, 5.52 <==, 5.83 <==    Hemoglobin  7.6 <<==, 7.4 <<==, 7.2 <<==    Hematocrit  26.5 <==, 26.3 <==, 25.8 <==    Platelets  325 <==, 209 <==, 180 <==      139  |  101  |  12  ----------------------------<  12  3.8   |  29  |  1.08    LYTES    sodium  139 <==, 141 <==, 141 <==    potassium   3.8 <==, 3.0 <==, 3.7 <==    chloride  101 <==, 102 <==, 102 <==    carbon dioxide  29 <==, 25 <==, 24 <==    =============================================================================================  RENAL FUNCTION:    Creatinine:   1.08  <<==, 0.86  <<==, 0.96  <<==    BUN:   12 <==, 8 <==, 8 <==    ============================================================================================    calcium   9.1 <==, 8.6 <==, 8.9 <==    phos       mag   2.4 <==    ============================================================================================  LFTs    AST:   32 <== , 36 <==     ALT:  20  <== , 21  <==     AP:  68  <=, 67  <=    Bili:  0.3  <=, 0.3  <=    D-Dimer Assay, Quantitative: <150 ng/mL DDU (04-15 @ 06:34  )  D-Dimer Assay, Quantitative: <150 ng/mL DDU ( @ 06:56)    D-Dimer Assay, Quantitative: 156 ng/mL DDU ( @ 06:56)    Ferritin, Serum in AM (.15.20 @ 07:55)    Ferritin, Serum: 36 ng/mL    Ferritin, Serum (.10.20 @ 13:12)    Ferritin, Serum: 37 ng/mL    C-Reactive Protein, Serum (15.20 @ 07:55)    C-Reactive Protein, Serum: 2.58 mg/dL    C-Reactive Protein, Serum (.11.20 @ 09:57)    C-Reactive Protein, Serum: 7.22 mg/dL    CARDIAC MARKERS ( 2020 06:56 )   U/L /CKMB x     /CKMB Units x        troponin x        MICROBIOLOGY:     COVID -19 positive 2020        RADIOLOGY:  [x ] Chest radiographs reviewed and interpreted by me      EXAM:  XR CHEST PORTABLE URGENT 1V                          PROCEDURE DATE:  04/10/2020      INTERPRETATION:  Indication: Hypoxic. To be admitted. COVID positive.    Technique: Single portable view of the chest.    Comparison: 2016    Findings: The cardiac silhouette is normal in size. There are patchy airspace opacities bilaterally, most pronounced in the lower lobes. The hilar and mediastinal structures appear unremarkable. There are no pleural effusions.    Impression: Bilateral patchy consolidations, most pronounced in the lower lobes, concerning for viral pneumonia such as COVID.    JOSE NUNEZ M.D., ATTENDING RADIOLOGIST  This document has been electronically signed. 2020  1:24PM  ---------------------------------------------------------------------------------------------------------------

## 2020-04-15 NOTE — PROGRESS NOTE ADULT - ASSESSMENT
44f hx obestiy, HTN, anxiety, hypothyroidism presenting with sepsis 2/2 covid19 diagnosed via OP swab on 4/6/20     Problem/Plan - 1:  ·  Problem: Pneumonia due to 2019 novel coronavirus.  Plan: with hypoxia to 87% on RA, improved with supplemental O2  - plaquenil 5 day protocol, qtc < 500  - o2 supplementation as needed-  consider solu-medrol 1-1.5mg/kg iv daily in two divided doses x 3-5 days  - avoid HFNC/bilevel- if needs o2 beyond NRB, would prone patient and call MICU consult for intubation  - per ID consult no other meds at this time  - supportive care   -titrate oxygen as tolerated   -trend inflammatory markers every few days or so - ferritin, CRP and D dimer   -would not initiate steroids or cytokine inhibitors        Problem/Plan - 2:  ·  Problem: Hypothyroidism.  Plan: continue synthroid.      Problem/Plan - 3:  ·  Problem: Hypertension.  Plan: Pt with diastolic HTN on presentation  - continue hctz 50mg po daily with hold parameters  - will uptitrate amlodipine to 10mg po daily with hold parameters.

## 2020-04-15 NOTE — CONSULT NOTE ADULT - ASSESSMENT
ASSESSMENT:    acute hypoxic respiratory failure due to COVID-19 related pneumonia - although these patient's are at high risk for clot formation, the non-elevated D-dimer level essentially excludes the diagnosis of VTE disease    PLAN/RECOMMENDATIONS:    attempt transition from 100% NRB to high level nasal canula to keep saturation greater than 90%  normal saline nose gtts  CXR  tessalon/robitussin DM as needed  albuterolMDI as needed  norvasc/HCTZ  diligent DVT prophylxis    Thank you for the courtesy of this referral. Plan of care discussed with the patient and her RN at bedside     Markos Kerns MD, [facility]  [phone]  Pulmonary Medicine

## 2020-04-15 NOTE — PROGRESS NOTE ADULT - ASSESSMENT
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   s/p 5 days Plaquenil 4/10-4/14  Requiring nonrebreather but maintaining adequate saturations and actually feels better today.   Low suspicion for a secondary infection but I don't believe the benefits of cytokine inhibitors or steroids outweigh the risks given her low-normal inflammatory markers.   Not a candidate for Remdesivir trial. Sarilumab has not been enrolling new patients. Other trials are being conducted by other departments.     Suggest  -supportive care   -titrate oxygen as tolerated   -trend inflammatory markers every few days or so - ferritin, CRP and D dimer   -would not initiate steroids or cytokine inhibitors     Mukesh Nagy MD   Infectious Disease   Pager [phone]   After 5PM and on weekends please page fellow on call or call [phone]

## 2020-04-16 LAB
ANION GAP SERPL CALC-SCNC: 15 MMOL/L — SIGNIFICANT CHANGE UP (ref 5–17)
BUN SERPL-MCNC: 17 MG/DL — SIGNIFICANT CHANGE UP (ref 7–23)
CALCIUM SERPL-MCNC: 9.2 MG/DL — SIGNIFICANT CHANGE UP (ref 8.4–10.5)
CHLORIDE SERPL-SCNC: 97 MMOL/L — SIGNIFICANT CHANGE UP (ref 96–108)
CO2 SERPL-SCNC: 27 MMOL/L — SIGNIFICANT CHANGE UP (ref 22–31)
CREAT SERPL-MCNC: 0.86 MG/DL — SIGNIFICANT CHANGE UP (ref 0.5–1.3)
GLUCOSE SERPL-MCNC: 85 MG/DL — SIGNIFICANT CHANGE UP (ref 70–99)
HCT VFR BLD CALC: 30.2 % — LOW (ref 34.5–45)
HGB BLD-MCNC: 8.3 G/DL — LOW (ref 11.5–15.5)
MCHC RBC-ENTMCNC: 18.7 PG — LOW (ref 27–34)
MCHC RBC-ENTMCNC: 27.5 GM/DL — LOW (ref 32–36)
MCV RBC AUTO: 67.9 FL — LOW (ref 80–100)
NRBC # BLD: 0 /100 WBCS — SIGNIFICANT CHANGE UP (ref 0–0)
PLATELET # BLD AUTO: 440 K/UL — HIGH (ref 150–400)
POTASSIUM SERPL-MCNC: 3.4 MMOL/L — LOW (ref 3.5–5.3)
POTASSIUM SERPL-SCNC: 3.4 MMOL/L — LOW (ref 3.5–5.3)
RBC # BLD: 4.45 M/UL — SIGNIFICANT CHANGE UP (ref 3.8–5.2)
RBC # FLD: 20.8 % — HIGH (ref 10.3–14.5)
SODIUM SERPL-SCNC: 139 MMOL/L — SIGNIFICANT CHANGE UP (ref 135–145)
WBC # BLD: 7.83 K/UL — SIGNIFICANT CHANGE UP (ref 3.8–10.5)
WBC # FLD AUTO: 7.83 K/UL — SIGNIFICANT CHANGE UP (ref 3.8–10.5)

## 2020-04-16 PROCEDURE — 99232 SBSQ HOSP IP/OBS MODERATE 35: CPT

## 2020-04-16 RX ORDER — POTASSIUM CHLORIDE 20 MEQ
40 PACKET (EA) ORAL ONCE
Refills: 0 | Status: COMPLETED | OUTPATIENT
Start: 2020-04-16 | End: 2020-04-16

## 2020-04-16 RX ADMIN — ENOXAPARIN SODIUM 40 MILLIGRAM(S): 100 INJECTION SUBCUTANEOUS at 16:48

## 2020-04-16 RX ADMIN — DULOXETINE HYDROCHLORIDE 60 MILLIGRAM(S): 30 CAPSULE, DELAYED RELEASE ORAL at 12:07

## 2020-04-16 RX ADMIN — ENOXAPARIN SODIUM 40 MILLIGRAM(S): 100 INJECTION SUBCUTANEOUS at 05:40

## 2020-04-16 RX ADMIN — AMLODIPINE BESYLATE 10 MILLIGRAM(S): 2.5 TABLET ORAL at 05:40

## 2020-04-16 RX ADMIN — Medication 137 MICROGRAM(S): at 05:41

## 2020-04-16 RX ADMIN — Medication 1 TABLET(S): at 12:07

## 2020-04-16 RX ADMIN — Medication 40 MILLIEQUIVALENT(S): at 12:08

## 2020-04-16 NOTE — PROGRESS NOTE ADULT - ASSESSMENT
44f hx obestiy, HTN, anxiety, hypothyroidism presenting with sepsis 2/2 covid19 diagnosed via OP swab on 4/6/20     Problem/Plan - 1:  ·  Problem: Pneumonia due to 2019 novel coronavirus.  Plan: with hypoxia , improved with supplemental O2  - plaquenil 5 day protocol, qtc < 500  - o2 supplementation as needed-  consider solu-medrol 1-1.5mg/kg iv daily in two divided doses x 3-5 days  - avoid HFNC/bilevel- if needs o2 beyond NRB, would prone patient and call MICU consult for intubation  - per ID consult no other meds at this time  - supportive care   -titrate oxygen as tolerated   -trend inflammatory markers every few days or so - ferritin, CRP and D dimer   -would not initiate steroids or cytokine inhibitors        Problem/Plan - 2:  ·  Problem: Hypothyroidism.  Plan: continue synthroid.      Problem/Plan - 3:  ·  Problem: Hypertension.  Plan: Pt with diastolic HTN on presentation  - continue hctz 50mg po daily with hold parameters  - will uptitrate amlodipine to 10mg po daily with hold parameters.

## 2020-04-16 NOTE — PROGRESS NOTE ADULT - SUBJECTIVE AND OBJECTIVE BOX
Patient is a 44y old  Female who presents with a chief complaint of shortness of breath (15 Apr 2020 14:23)      SUBJECTIVE / OVERNIGHT EVENTS:  doing much better.  on NC today 6 L saturating above 92%    MEDICATIONS  (STANDING):  amLODIPine   Tablet 10 milliGRAM(s) Oral daily  DULoxetine 60 milliGRAM(s) Oral daily  enoxaparin Injectable 40 milliGRAM(s) SubCutaneous every 12 hours  hydrochlorothiazide 50 milliGRAM(s) Oral daily  levothyroxine 137 MICROGram(s) Oral daily  Nephro-rimma 1 Tablet(s) Oral daily    MEDICATIONS  (PRN):  acetaminophen   Tablet .. 650 milliGRAM(s) Oral every 4 hours PRN Temp greater or equal to 38.5C (101.3F)  ALBUTerol    90 MICROgram(s) HFA Inhaler 2 Puff(s) Inhalation every 4 hours PRN Shortness of Breath and/or Wheezing  benzonatate 100 milliGRAM(s) Oral three times a day PRN Cough  guaifenesin/dextromethorphan  Syrup 10 milliLiter(s) Oral every 4 hours PRN Cough  ondansetron Injectable 4 milliGRAM(s) IV Push every 6 hours PRN Nausea and/or Vomiting  sodium chloride 0.65% Nasal 1 Spray(s) Both Nostrils every 4 hours PRN Nasal Congestion      Vital Signs Last 24 Hrs  T(C): 36.7 (16 Apr 2020 13:01), Max: 37 (16 Apr 2020 00:03)  T(F): 98 (16 Apr 2020 13:01), Max: 98.6 (16 Apr 2020 00:03)  HR: 96 (16 Apr 2020 13:01) (91 - 96)  BP: 123/83 (16 Apr 2020 13:01) (101/69 - 123/83)  BP(mean): --  RR: 18 (16 Apr 2020 13:01) (18 - 18)  SpO2: 96% (16 Apr 2020 13:01) (94% - 97%)  CAPILLARY BLOOD GLUCOSE        I&O's Summary        LABS:                        8.3    7.83  )-----------( 440      ( 16 Apr 2020 06:48 )             30.2     04-16    139  |  97  |  17  ----------------------------<  85  3.4<L>   |  27  |  0.86    Ca    9.2      16 Apr 2020 06:47                RADIOLOGY & ADDITIONAL TESTS:    Imaging Personally Reviewed:    Consultant(s) Notes Reviewed:      Care Discussed with Consultants/Other Providers:

## 2020-04-16 NOTE — PROGRESS NOTE ADULT - ASSESSMENT
ASSESSMENT:    acute hypoxic respiratory failure due to COVID-19 related pneumonia - although these patient's are at high risk for clot formation, the non-elevated D-dimer level essentially excludes the diagnosis of VTE disease    PLAN/RECOMMENDATIONS:    oxygen supplementation via a nasal canula to keep saturation greater than 92%  normal saline nose gtts  CXR reviewed - improved bilateral infiltrates  tessalon/robitussin DM as needed  albuterolMDI as needed  norvasc/HCTZ  diligent DVT prophylxis    Will follow with you. Plan of care discussed with the patient at bedside and with Dr. Teixeira. Anticipate discharge home early next week with supplemental oxygen.    Markos Kerns MD, [facility]  [phone]  Pulmonary Medicine

## 2020-04-16 NOTE — PROGRESS NOTE ADULT - SUBJECTIVE AND OBJECTIVE BOX
Follow Up: COVID    Interval History/ROS: Feels better today. Able to walk around without getting short of breath. On nasal cannula and saturating well. No pain. No cough anymore. No fevers or chills.     Allergies  No Known Allergies    ANTIMICROBIALS:      OTHER MEDS:  acetaminophen   Tablet .. 650 milliGRAM(s) Oral every 4 hours PRN  ALBUTerol    90 MICROgram(s) HFA Inhaler 2 Puff(s) Inhalation every 4 hours PRN  amLODIPine   Tablet 10 milliGRAM(s) Oral daily  benzonatate 100 milliGRAM(s) Oral three times a day PRN  DULoxetine 60 milliGRAM(s) Oral daily  enoxaparin Injectable 40 milliGRAM(s) SubCutaneous every 12 hours  guaifenesin/dextromethorphan  Syrup 10 milliLiter(s) Oral every 4 hours PRN  hydrochlorothiazide 50 milliGRAM(s) Oral daily  levothyroxine 137 MICROGram(s) Oral daily  Nephro-rimma 1 Tablet(s) Oral daily  ondansetron Injectable 4 milliGRAM(s) IV Push every 6 hours PRN  sodium chloride 0.65% Nasal 1 Spray(s) Both Nostrils every 4 hours PRN      Vital Signs Last 24 Hrs  T(C): 36.7 (16 Apr 2020 13:01), Max: 37 (16 Apr 2020 00:03)  T(F): 98 (16 Apr 2020 13:01), Max: 98.6 (16 Apr 2020 00:03)  HR: 96 (16 Apr 2020 13:01) (91 - 96)  BP: 123/83 (16 Apr 2020 13:01) (101/69 - 123/83)  BP(mean): --  RR: 18 (16 Apr 2020 13:01) (18 - 18)  SpO2: 96% (16 Apr 2020 13:01) (94% - 97%)    Physical Exam:  General: awake, alert, non toxic  Head: atraumatic, normocephalic  Eye: normal sclera and conjunctiva  Cardio: regular rate and rhythm   Respiratory: nonlabored on nasal cannula   Neurologic: no focal deficit  psych: normal affect                          8.3    7.83  )-----------( 440      ( 16 Apr 2020 06:48 )             30.2       04-16    139  |  97  |  17  ----------------------------<  85  3.4<L>   |  27  |  0.86    Ca    9.2      16 Apr 2020 06:47      MICROBIOLOGY:      RADIOLOGY:  Images below reviewed personally  Xray Chest 1 View- PORTABLE-Urgent (04.15.20 @ 13:52)   The heart is normal in size. Bilateral airspace opacities is seen in both lower lobes which appears to be slightly improved when compared to previous study done April 10, 2020. Changes compatible with COVID 19 pneumonia. No pleural effusion. No acute bony pathology could be identified.

## 2020-04-16 NOTE — PROGRESS NOTE ADULT - SUBJECTIVE AND OBJECTIVE BOX
[facility] - [facility] - PROGRESS NOTE    CHIEF COMPLAINT: hypoxic respiratory failure; COVID-19 related pneumonia    INTERVAL HISTORY: transitioned from 100% NRB to high flow nasal canula without shortness of breath; no cough, sputum production, chest congestion or wheeze; no fevers, chills or sweats; no chest pain/pressure or palpitations; good appetite; no diarrhea    REVIEW OF SYSTEMS:  Constitutional: As per interval history  HEENT: Within normal limits  CV: As per interval history  Resp: As per interval history  GI: Within normal limits   : Within normal limits  Musculoskeletal: Within normal limits  Skin: Within normal limits  Neurological: Within normal limits  Psychiatric: Within normal limits  Endocrine: Within normal limits  Hematologic/Lymphatic: Within normal limits  Allergic/Immunologic: Within normal limits      MEDICATIONS:     Pulmonary "    Anti-microbials:    Cardiovascular:  amLODIPine   Tablet 10 milliGRAM(s) Oral daily  hydrochlorothiazide 50 milliGRAM(s) Oral daily    Other:  DULoxetine 60 milliGRAM(s) Oral daily  enoxaparin Injectable 40 milliGRAM(s) SubCutaneous every 12 hours  levothyroxine 137 MICROGram(s) Oral daily  Nephro-rimma 1 Tablet(s) Oral daily    MEDICATIONS  (PRN):  acetaminophen   Tablet .. 650 milliGRAM(s) Oral every 4 hours PRN Temp greater or equal to 38.5C (101.3F)  ALBUTerol    90 MICROgram(s) HFA Inhaler 2 Puff(s) Inhalation every 4 hours PRN Shortness of Breath and/or Wheezing  benzonatate 100 milliGRAM(s) Oral three times a day PRN Cough  guaifenesin/dextromethorphan  Syrup 10 milliLiter(s) Oral every 4 hours PRN Cough  ondansetron Injectable 4 milliGRAM(s) IV Push every 6 hours PRN Nausea and/or Vomiting  sodium chloride 0.65% Nasal 1 Spray(s) Both Nostrils every 4 hours PRN Nasal Congestion        OBJECTIVE:    PHYSICAL EXAM:       ICU Vital Signs Last 24 Hrs  T(C): 36.8 (16 Apr 2020 04:36), Max: 37 (16 Apr 2020 00:03)  T(F): 98.2 (16 Apr 2020 04:36), Max: 98.6 (16 Apr 2020 00:03)  HR: 91 (16 Apr 2020 04:36) (91 - 96)  BP: 106/76 (16 Apr 2020 04:36) (101/69 - 120/85)  BP(mean): --  ABP: --  ABP(mean): --  RR: 18 (16 Apr 2020 04:36) (18 - 18)  SpO2: 96% (16 Apr 2020 04:36) (94% - 97%) on 6lpm nasal canula     General: Awake. Alert. Cooperative. No distress. Appears stated age 	  HEENT:   Atraumatic. Normocephalic. Anicteric. Normal oral mucosa. PERRL. EOMI.  Neck: Supple. Trachea midline. Thyroid without enlargement/tenderness/nodules. No carotid bruit. No JVD.	  Cardiovascular: Regular rate and rhythm. S1 S2 normal. No murmurs, rubs or gallops.  Respiratory: Respirations unlabored. Bibasilar rales. No curvature.  Abdomen: Soft. Non-tender. Non-distended. No organomegaly. No masses. Normal bowel sounds.  Extremities: Warm to touch. No clubbing or cyanosis. No pedal edema.  Pulses: 2+ peripheral pulses all extremities.	  Skin: Normal skin color. No rashes or lesions. No ecchymoses. No cyanosis. Warm to touch.  Lymph Nodes: Cervical, supraclavicular and axillary nodes normal  Neurological: Motor and sensory examination equal and normal. A and O x 3  Psychiatry: Appropriate mood and affect.    LABS:                          8.3    7.83  )-----------( 440      ( 16 Apr 2020 06:48 )             30.2     CBC    WBC  7.83 <==, 6.21 <==, 5.52 <==, 5.83 <==    Hemoglobin  8.3 <<==, 7.6 <<==, 7.4 <<==, 7.2 <<==    Hematocrit  30.2 <==, 26.5 <==, 26.3 <==, 25.8 <==    Platelets  440 <==, 325 <==, 209 <==, 180 <==      139  |  97  |  17  ----------------------------<  85    04-16  3.4<L>   |  27  |  0.86      LYTES    sodium  139 <==, 139 <==, 141 <==, 141 <==    potassium   3.4 <==, 3.8 <==, 3.0 <==, 3.7 <==    chloride  97 <==, 101 <==, 102 <==, 102 <==    carbon dioxide  27 <==, 29 <==, 25 <==, 24 <==    =============================================================================================  RENAL FUNCTION:    Creatinine:   0.86  <<==, 1.08  <<==, 0.86  <<==, 0.96  <<==    BUN:   17 <==, 12 <==, 8 <==, 8 <==    ============================================================================================    calcium   9.2 <==, 9.1 <==, 8.6 <==, 8.9 <==    mag   2.4 <==    ============================================================================================  LFTs    AST:   32 <== , 36 <==     ALT:  20  <== , 21  <==     AP:  68  <=, 67  <=    Bili:  0.3  <=, 0.3  <=    D-Dimer Assay, Quantitative: <150 ng/mL DDU (04-15 @ 06:34)    D-Dimer Assay, Quantitative: <150 ng/mL DDU (04-13 @ 06:56)    D-Dimer Assay, Quantitative: 156 ng/mL DDU (04-11 @ 06:56)    Ferritin, Serum in AM (04.15.20 @ 07:55)    Ferritin, Serum: 36 ng/mL    Ferritin, Serum (04.10.20 @ 13:12)    Ferritin, Serum: 37 ng/mL    C-Reactive Protein, Serum (04.15.20 @ 07:55)    C-Reactive Protein, Serum: 2.58 mg/dL    C-Reactive Protein, Serum (04.11.20 @ 09:57)    C-Reactive Protein, Serum: 7.22 mg/dL    CARDIAC MARKERS ( 11 Apr 2020 06:56 )   U/L /CKMB x     /CKMB Units x        troponin x        MICROBIOLOGY:     COVID -19 positive 4/6/2020        RADIOLOGY:  [x ] Chest radiographs reviewed and interpreted by me      EXAM:  XR CHEST PORTABLE URGENT 1V                          PROCEDURE DATE:  04/15/2020      INTERPRETATION:  A single chest x-ray was obtained on April 15, 2020.    Indication: High oxygen requirement with COVID 19.    Impression:    The heart is normal in size. Bilateral airspace opacities is seen in both lower lobes which appears to be slightly improved when compared to previous study done April 10, 2020. Changes compatible with COVID 19 pneumonia. No pleural effusion. No acute bony pathology could be identified.    ANTONY GIFFORD M.D., ATTENDING RADIOLOGIST  This document has been electronically signed. Apr 15 2020  2:03PM  ---------------------------------------------------------------------------------------------------------------  EXAM:  XR CHEST PORTABLE URGENT 1V                          PROCEDURE DATE:  04/10/2020      INTERPRETATION:  Indication: Hypoxic. To be admitted. COVID positive.    Technique: Single portable view of the chest.    Comparison: 2/17/2016    Findings: The cardiac silhouette is normal in size. There are patchy airspace opacities bilaterally, most pronounced in the lower lobes. The hilar and mediastinal structures appear unremarkable. There are no pleural effusions.    Impression: Bilateral patchy consolidations, most pronounced in the lower lobes, concerning for viral pneumonia such as COVID.    JOSE NUNEZ M.D., ATTENDING RADIOLOGIST  This document has been electronically signed. Apr 11 2020  1:24PM  ---------------------------------------------------------------------------------------------------------------

## 2020-04-16 NOTE — PROGRESS NOTE ADULT - ASSESSMENT
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   s/p 5 days Plaquenil 4/10-4/14  Much improved, off nonrebreather and ambulating without dyspnea.   She's no interested in investigational trials.     Suggest  -supportive care, titrate oxygen as tolerated   -would not initiate steroids or cytokine inhibitors given low inflammatory markers and clinical improvement   -if continues to improve no objection to discharge     Mukesh Nagy MD   Infectious Disease   Pager [phone]   After 5PM and on weekends please page fellow on call or call [phone]

## 2020-04-17 PROCEDURE — 99232 SBSQ HOSP IP/OBS MODERATE 35: CPT

## 2020-04-17 RX ADMIN — ENOXAPARIN SODIUM 40 MILLIGRAM(S): 100 INJECTION SUBCUTANEOUS at 18:21

## 2020-04-17 RX ADMIN — Medication 137 MICROGRAM(S): at 04:43

## 2020-04-17 RX ADMIN — DULOXETINE HYDROCHLORIDE 60 MILLIGRAM(S): 30 CAPSULE, DELAYED RELEASE ORAL at 11:55

## 2020-04-17 RX ADMIN — ENOXAPARIN SODIUM 40 MILLIGRAM(S): 100 INJECTION SUBCUTANEOUS at 04:43

## 2020-04-17 RX ADMIN — Medication 1 TABLET(S): at 11:55

## 2020-04-17 RX ADMIN — AMLODIPINE BESYLATE 10 MILLIGRAM(S): 2.5 TABLET ORAL at 04:43

## 2020-04-17 NOTE — PROGRESS NOTE ADULT - ASSESSMENT
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   s/p 5 days Plaquenil 4/10-4/14  Continues to improve every day in symptoms and oxygen requirements.     Suggest  -supportive care, titrate oxygen as tolerated and discharge home when able to   -no further therapy for COVID     Please call back if needed     Mukesh Nagy MD   Infectious Disease   Pager [phone]   After 5PM and on weekends please page fellow on call or call [phone]

## 2020-04-17 NOTE — PROGRESS NOTE ADULT - SUBJECTIVE AND OBJECTIVE BOX
Patient is a 44y old  Female who presents with a chief complaint of shortness of breath (17 Apr 2020 13:34)      SUBJECTIVE / OVERNIGHT EVENTS:    MEDICATIONS  (STANDING):  amLODIPine   Tablet 10 milliGRAM(s) Oral daily  DULoxetine 60 milliGRAM(s) Oral daily  enoxaparin Injectable 40 milliGRAM(s) SubCutaneous every 12 hours  hydrochlorothiazide 50 milliGRAM(s) Oral daily  levothyroxine 137 MICROGram(s) Oral daily  Nephro-rimma 1 Tablet(s) Oral daily    MEDICATIONS  (PRN):  acetaminophen   Tablet .. 650 milliGRAM(s) Oral every 4 hours PRN Temp greater or equal to 38.5C (101.3F)  ALBUTerol    90 MICROgram(s) HFA Inhaler 2 Puff(s) Inhalation every 4 hours PRN Shortness of Breath and/or Wheezing  benzonatate 100 milliGRAM(s) Oral three times a day PRN Cough  guaifenesin/dextromethorphan  Syrup 10 milliLiter(s) Oral every 4 hours PRN Cough  ondansetron Injectable 4 milliGRAM(s) IV Push every 6 hours PRN Nausea and/or Vomiting  sodium chloride 0.65% Nasal 1 Spray(s) Both Nostrils every 4 hours PRN Nasal Congestion      Vital Signs Last 24 Hrs  T(C): 36.7 (17 Apr 2020 04:24), Max: 36.7 (17 Apr 2020 04:24)  T(F): 98 (17 Apr 2020 04:24), Max: 98 (17 Apr 2020 04:24)  HR: 93 (17 Apr 2020 12:01) (85 - 93)  BP: 103/70 (17 Apr 2020 12:01) (103/70 - 109/75)  BP(mean): --  RR: 10 (17 Apr 2020 12:36) (10 - 18)  SpO2: 98% (17 Apr 2020 12:36) (96% - 98%)  CAPILLARY BLOOD GLUCOSE        I&O's Summary        LABS:                        8.3    7.83  )-----------( 440      ( 16 Apr 2020 06:48 )             30.2     04-16    139  |  97  |  17  ----------------------------<  85  3.4<L>   |  27  |  0.86    Ca    9.2      16 Apr 2020 06:47                RADIOLOGY & ADDITIONAL TESTS:    Imaging Personally Reviewed:    Consultant(s) Notes Reviewed:      Care Discussed with Consultants/Other Providers:

## 2020-04-17 NOTE — PROGRESS NOTE ADULT - ASSESSMENT
44f hx obestiy, HTN, anxiety, hypothyroidism presenting with sepsis 2/2 covid19 diagnosed via OP swab on 4/6/20     Problem/Plan - 1:  ·  Problem: Pneumonia due to 2019 novel coronavirus.  Plan: with hypoxia , improved with supplemental O2  - plaquenil 5 day protocol, qtc < 500  - o2 supplementation as needed-  consider solu-medrol 1-1.5mg/kg iv daily in two divided doses x 3-5 days  - avoid HFNC/bilevel- if needs o2 beyond NRB, would prone patient and call MICU consult for intubation  - per ID consult no other meds at this time  - supportive care   -titrate oxygen as tolerated   -trend inflammatory markers every few days or so - ferritin, CRP and D dimer   -would not initiate steroids or cytokine inhibitors        Problem/Plan - 2:  ·  Problem: Hypothyroidism.  Plan: continue synthroid.      Problem/Plan - 3:  ·  Problem: Hypertension.  Plan: Pt with diastolic HTN on presentation  - continue hctz 50mg po daily with hold parameters  - will uptitrate amlodipine to 10mg po daily with hold parameters.     can be discharged once off supplemental O2

## 2020-04-17 NOTE — PROGRESS NOTE ADULT - ASSESSMENT
ASSESSMENT:    acute hypoxic respiratory failure due to COVID-19 related pneumonia - although these patient's are at high risk for clot formation, the non-elevated D-dimer level essentially excludes the diagnosis of VTE disease    PLAN/RECOMMENDATIONS:    oxygen supplementation via a nasal canula to keep saturation greater than 92% - taper as tolerated  normal saline nose gtts  has completed a 5 day course of Plaquenil  no indication for systemic steroids or cytokine inhibitors given clinical improvement  CXR reviewed - improved bilateral infiltrates  tessalon/robitussin DM as needed  albuterol MDI as needed  norvasc/HCTZ  diligent DVT prophylxis    Will follow with you. Plan of care discussed with the patient at bedside and with the dedicated floor NP. Aggressive weaning from oxygen scheduled for today in hopes of discharge home.    Markos Kerns MD, [facility]  [phone]  Pulmonary Medicine

## 2020-04-17 NOTE — PROGRESS NOTE ADULT - SUBJECTIVE AND OBJECTIVE BOX
[facility] - [facility] - PROGRESS NOTE    CHIEF COMPLAINT: hypoxic respiratory failure; COVID-19 related pneumonia    INTERVAL HISTORY: no shortness of breath on decreasing nasal canula oxygen requirements; no cough, sputum production, chest congestion or wheeze; no fevers, chills or sweats; no chest pain/pressure or palpitations; good appetite; no diarrhea    REVIEW OF SYSTEMS:  Constitutional: As per interval history  HEENT: Within normal limits  CV: As per interval history  Resp: As per interval history  GI: Within normal limits   : Within normal limits  Musculoskeletal: Within normal limits  Skin: Within normal limits  Neurological: Within normal limits  Psychiatric: Within normal limits  Endocrine: Within normal limits  Hematologic/Lymphatic: Within normal limits  Allergic/Immunologic: Within normal limits    MEDICATIONS:     Pulmonary "    Anti-microbials:    Cardiovascular:  amLODIPine   Tablet 10 milliGRAM(s) Oral daily  hydrochlorothiazide 50 milliGRAM(s) Oral daily    Other:  DULoxetine 60 milliGRAM(s) Oral daily  enoxaparin Injectable 40 milliGRAM(s) SubCutaneous every 12 hours  levothyroxine 137 MICROGram(s) Oral daily  Nephro-rimma 1 Tablet(s) Oral daily    MEDICATIONS  (PRN):  acetaminophen   Tablet .. 650 milliGRAM(s) Oral every 4 hours PRN Temp greater or equal to 38.5C (101.3F)  ALBUTerol    90 MICROgram(s) HFA Inhaler 2 Puff(s) Inhalation every 4 hours PRN Shortness of Breath and/or Wheezing  benzonatate 100 milliGRAM(s) Oral three times a day PRN Cough  guaifenesin/dextromethorphan  Syrup 10 milliLiter(s) Oral every 4 hours PRN Cough  ondansetron Injectable 4 milliGRAM(s) IV Push every 6 hours PRN Nausea and/or Vomiting  sodium chloride 0.65% Nasal 1 Spray(s) Both Nostrils every 4 hours PRN Nasal Congestion        OBJECTIVE:    PHYSICAL EXAM:       ICU Vital Signs Last 24 Hrs  T(C): 36.7 (17 Apr 2020 04:24), Max: 36.7 (16 Apr 2020 13:01)  T(F): 98 (17 Apr 2020 04:24), Max: 98 (16 Apr 2020 13:01)  HR: 90 (17 Apr 2020 04:24) (85 - 96)  BP: 107/71 (17 Apr 2020 04:24) (107/71 - 123/83)  BP(mean): --  ABP: --  ABP(mean): --  RR: 18 (17 Apr 2020 04:24) (18 - 18)  SpO2: 97% (17 Apr 2020 04:24) (96% - 97%) on 6lplm nasal canula -> 98% on 4lpm nasal canula     General: Awake. Alert. Cooperative. No distress. Appears stated age 	  HEENT:   Atraumatic. Normocephalic. Anicteric. Normal oral mucosa. PERRL. EOMI.  Neck: Supple. Trachea midline. Thyroid without enlargement/tenderness/nodules. No carotid bruit. No JVD.	  Cardiovascular: Regular rate and rhythm. S1 S2 normal. No murmurs, rubs or gallops.  Respiratory: Respirations unlabored. Bibasilar rales. No curvature.  Abdomen: Soft. Non-tender. Non-distended. No organomegaly. No masses. Normal bowel sounds.  Extremities: Warm to touch. No clubbing or cyanosis. No pedal edema.  Pulses: 2+ peripheral pulses all extremities.	  Skin: Normal skin color. No rashes or lesions. No ecchymoses. No cyanosis. Warm to touch.  Lymph Nodes: Cervical, supraclavicular and axillary nodes normal  Neurological: Motor and sensory examination equal and normal. A and O x 3  Psychiatry: Appropriate mood and affect.    LABS:                          8.3    7.83  )-----------( 440      ( 16 Apr 2020 06:48 )             30.2     CBC    WBC  7.83 <==, 6.21 <==, 5.52 <==    Hemoglobin  8.3 <<==, 7.6 <<==, 7.4 <<==    Hematocrit  30.2 <==, 26.5 <==, 26.3 <==    Platelets  440 <==, 325 <==, 209 <==      139  |  97  |  17  ----------------------------<  85    04-16  3.4<L>   |  27  |  0.86      LYTES    sodium  139 <==, 139 <==, 141 <==    potassium   3.4 <==, 3.8 <==, 3.0 <==    chloride  97 <==, 101 <==, 102 <==    carbon dioxide  27 <==, 29 <==, 25 <==    =============================================================================================  RENAL FUNCTION:    Creatinine:   0.86  <<==, 1.08  <<==, 0.86  <<==    BUN:   17 <==, 12 <==, 8 <==    ============================================================================================    calcium   9.2 <==, 9.1 <==, 8.6 <==    mag   2.4 <==    ============================================================================================  LFTs    AST:   32 <==     ALT:  20  <==     AP:  68  <=    Bili:  0.3  <=    D-Dimer Assay, Quantitative: <150 ng/mL DDU (04-15 @ 06:34)    D-Dimer Assay, Quantitative: <150 ng/mL DDU (04-13 @ 06:56)    D-Dimer Assay, Quantitative: 156 ng/mL DDU (04-11 @ 06:56)    Ferritin, Serum in AM (04.15.20 @ 07:55)    Ferritin, Serum: 36 ng/mL    Ferritin, Serum (04.10.20 @ 13:12)    Ferritin, Serum: 37 ng/mL    C-Reactive Protein, Serum (04.15.20 @ 07:55)    C-Reactive Protein, Serum: 2.58 mg/dL    C-Reactive Protein, Serum (04.11.20 @ 09:57)    C-Reactive Protein, Serum: 7.22 mg/dL    CARDIAC MARKERS ( 11 Apr 2020 06:56 )   U/L /CKMB x     /CKMB Units x        troponin x        MICROBIOLOGY:     COVID -19 positive 4/6/2020        RADIOLOGY:  [x ] Chest radiographs reviewed and interpreted by me      EXAM:  XR CHEST PORTABLE URGENT 1V                          PROCEDURE DATE:  04/15/2020      INTERPRETATION:  A single chest x-ray was obtained on April 15, 2020.    Indication: High oxygen requirement with COVID 19.    Impression:    The heart is normal in size. Bilateral airspace opacities is seen in both lower lobes which appears to be slightly improved when compared to previous study done April 10, 2020. Changes compatible with COVID 19 pneumonia. No pleural effusion. No acute bony pathology could be identified.    ANTONY GIFFORD M.D., ATTENDING RADIOLOGIST  This document has been electronically signed. Apr 15 2020  2:03PM  ---------------------------------------------------------------------------------------------------------------  EXAM:  XR CHEST PORTABLE URGENT 1V                          PROCEDURE DATE:  04/10/2020      INTERPRETATION:  Indication: Hypoxic. To be admitted. COVID positive.    Technique: Single portable view of the chest.    Comparison: 2/17/2016    Findings: The cardiac silhouette is normal in size. There are patchy airspace opacities bilaterally, most pronounced in the lower lobes. The hilar and mediastinal structures appear unremarkable. There are no pleural effusions.    Impression: Bilateral patchy consolidations, most pronounced in the lower lobes, concerning for viral pneumonia such as COVID.    JOSE NUNEZ M.D., ATTENDING RADIOLOGIST  This document has been electronically signed. Apr 11 2020  1:24PM  ---------------------------------------------------------------------------------------------------------------

## 2020-04-17 NOTE — PROGRESS NOTE ADULT - SUBJECTIVE AND OBJECTIVE BOX
Follow Up:  COVID    Interval History/ROS: Feels better every day. Walking around. No coughing. Down to 4L nasal cannula. Hopes to go home ASAP.    Allergies  No Known Allergies    ANTIMICROBIALS:      OTHER MEDS:  acetaminophen   Tablet .. 650 milliGRAM(s) Oral every 4 hours PRN  ALBUTerol    90 MICROgram(s) HFA Inhaler 2 Puff(s) Inhalation every 4 hours PRN  amLODIPine   Tablet 10 milliGRAM(s) Oral daily  benzonatate 100 milliGRAM(s) Oral three times a day PRN  DULoxetine 60 milliGRAM(s) Oral daily  enoxaparin Injectable 40 milliGRAM(s) SubCutaneous every 12 hours  guaifenesin/dextromethorphan  Syrup 10 milliLiter(s) Oral every 4 hours PRN  hydrochlorothiazide 50 milliGRAM(s) Oral daily  levothyroxine 137 MICROGram(s) Oral daily  Nephro-rimma 1 Tablet(s) Oral daily  ondansetron Injectable 4 milliGRAM(s) IV Push every 6 hours PRN  sodium chloride 0.65% Nasal 1 Spray(s) Both Nostrils every 4 hours PRN      Vital Signs Last 24 Hrs  T(C): 36.7 (17 Apr 2020 04:24), Max: 36.7 (17 Apr 2020 04:24)  T(F): 98 (17 Apr 2020 04:24), Max: 98 (17 Apr 2020 04:24)  HR: 93 (17 Apr 2020 12:01) (85 - 93)  BP: 103/70 (17 Apr 2020 12:01) (103/70 - 109/75)  BP(mean): --  RR: 10 (17 Apr 2020 12:36) (10 - 18)  SpO2: 98% (17 Apr 2020 12:36) (96% - 98%)    Physical Exam:  General: awake, alert, non toxic  Head: atraumatic, normocephalic  Eye: normal sclera and conjunctiva  Cardio: regular rate   Respiratory: nonlabored on nasal cannula, trace crackles but grossly clear bilaterally, no wheezing  Neurologic: no focal deficit  psych: normal affect                          8.3    7.83  )-----------( 440      ( 16 Apr 2020 06:48 )             30.2       04-16    139  |  97  |  17  ----------------------------<  85  3.4<L>   |  27  |  0.86    Ca    9.2      16 Apr 2020 06:47      MICROBIOLOGY:        RADIOLOGY:  Images below reviewed personally  Xray Chest 1 View- PORTABLE-Urgent (04.15.20 @ 13:52)   The heart is normal in size. Bilateral airspace opacities is seen in both lower lobes which appears to be slightly improved when compared to previous study done April 10, 2020. Changes compatible with COVID 19 pneumonia. No pleural effusion. No acute bony pathology could be identified.

## 2020-04-18 ENCOUNTER — TRANSCRIPTION ENCOUNTER (OUTPATIENT)
Age: 45
End: 2020-04-18

## 2020-04-18 VITALS
HEART RATE: 98 BPM | OXYGEN SATURATION: 97 % | TEMPERATURE: 98 F | SYSTOLIC BLOOD PRESSURE: 104 MMHG | DIASTOLIC BLOOD PRESSURE: 75 MMHG | RESPIRATION RATE: 18 BRPM

## 2020-04-18 LAB
ALBUMIN SERPL ELPH-MCNC: 3.4 G/DL — SIGNIFICANT CHANGE UP (ref 3.3–5)
ALP SERPL-CCNC: 91 U/L — SIGNIFICANT CHANGE UP (ref 40–120)
ALT FLD-CCNC: 47 U/L — HIGH (ref 10–45)
ANION GAP SERPL CALC-SCNC: 14 MMOL/L — SIGNIFICANT CHANGE UP (ref 5–17)
AST SERPL-CCNC: 33 U/L — SIGNIFICANT CHANGE UP (ref 10–40)
BILIRUB SERPL-MCNC: 0.2 MG/DL — SIGNIFICANT CHANGE UP (ref 0.2–1.2)
BUN SERPL-MCNC: 14 MG/DL — SIGNIFICANT CHANGE UP (ref 7–23)
CALCIUM SERPL-MCNC: 9.3 MG/DL — SIGNIFICANT CHANGE UP (ref 8.4–10.5)
CHLORIDE SERPL-SCNC: 99 MMOL/L — SIGNIFICANT CHANGE UP (ref 96–108)
CO2 SERPL-SCNC: 26 MMOL/L — SIGNIFICANT CHANGE UP (ref 22–31)
CREAT SERPL-MCNC: 0.81 MG/DL — SIGNIFICANT CHANGE UP (ref 0.5–1.3)
CRP SERPL-MCNC: 0.83 MG/DL — HIGH (ref 0–0.4)
FERRITIN SERPL-MCNC: 37 NG/ML — SIGNIFICANT CHANGE UP (ref 15–150)
GLUCOSE SERPL-MCNC: 81 MG/DL — SIGNIFICANT CHANGE UP (ref 70–99)
HCT VFR BLD CALC: 29.9 % — LOW (ref 34.5–45)
HGB BLD-MCNC: 8.1 G/DL — LOW (ref 11.5–15.5)
MAGNESIUM SERPL-MCNC: 2.3 MG/DL — SIGNIFICANT CHANGE UP (ref 1.6–2.6)
MCHC RBC-ENTMCNC: 18.6 PG — LOW (ref 27–34)
MCHC RBC-ENTMCNC: 27.1 GM/DL — LOW (ref 32–36)
MCV RBC AUTO: 68.7 FL — LOW (ref 80–100)
NRBC # BLD: 0 /100 WBCS — SIGNIFICANT CHANGE UP (ref 0–0)
PHOSPHATE SERPL-MCNC: 3.1 MG/DL — SIGNIFICANT CHANGE UP (ref 2.5–4.5)
PLATELET # BLD AUTO: 516 K/UL — HIGH (ref 150–400)
POTASSIUM SERPL-MCNC: 3.6 MMOL/L — SIGNIFICANT CHANGE UP (ref 3.5–5.3)
POTASSIUM SERPL-SCNC: 3.6 MMOL/L — SIGNIFICANT CHANGE UP (ref 3.5–5.3)
PROT SERPL-MCNC: 7.2 G/DL — SIGNIFICANT CHANGE UP (ref 6–8.3)
RBC # BLD: 4.35 M/UL — SIGNIFICANT CHANGE UP (ref 3.8–5.2)
RBC # FLD: 21 % — HIGH (ref 10.3–14.5)
SODIUM SERPL-SCNC: 139 MMOL/L — SIGNIFICANT CHANGE UP (ref 135–145)
WBC # BLD: 6.52 K/UL — SIGNIFICANT CHANGE UP (ref 3.8–10.5)
WBC # FLD AUTO: 6.52 K/UL — SIGNIFICANT CHANGE UP (ref 3.8–10.5)

## 2020-04-18 PROCEDURE — 85379 FIBRIN DEGRADATION QUANT: CPT

## 2020-04-18 PROCEDURE — 86140 C-REACTIVE PROTEIN: CPT

## 2020-04-18 PROCEDURE — 85027 COMPLETE CBC AUTOMATED: CPT

## 2020-04-18 PROCEDURE — 83605 ASSAY OF LACTIC ACID: CPT

## 2020-04-18 PROCEDURE — 82550 ASSAY OF CK (CPK): CPT

## 2020-04-18 PROCEDURE — 84132 ASSAY OF SERUM POTASSIUM: CPT

## 2020-04-18 PROCEDURE — 85014 HEMATOCRIT: CPT

## 2020-04-18 PROCEDURE — 83615 LACTATE (LD) (LDH) ENZYME: CPT

## 2020-04-18 PROCEDURE — 82330 ASSAY OF CALCIUM: CPT

## 2020-04-18 PROCEDURE — 80048 BASIC METABOLIC PNL TOTAL CA: CPT

## 2020-04-18 PROCEDURE — 93005 ELECTROCARDIOGRAM TRACING: CPT

## 2020-04-18 PROCEDURE — 82947 ASSAY GLUCOSE BLOOD QUANT: CPT

## 2020-04-18 PROCEDURE — 94640 AIRWAY INHALATION TREATMENT: CPT

## 2020-04-18 PROCEDURE — 82803 BLOOD GASES ANY COMBINATION: CPT

## 2020-04-18 PROCEDURE — 84100 ASSAY OF PHOSPHORUS: CPT

## 2020-04-18 PROCEDURE — 71045 X-RAY EXAM CHEST 1 VIEW: CPT

## 2020-04-18 PROCEDURE — 84145 PROCALCITONIN (PCT): CPT

## 2020-04-18 PROCEDURE — 80053 COMPREHEN METABOLIC PANEL: CPT

## 2020-04-18 PROCEDURE — 83735 ASSAY OF MAGNESIUM: CPT

## 2020-04-18 PROCEDURE — 99285 EMERGENCY DEPT VISIT HI MDM: CPT

## 2020-04-18 PROCEDURE — 84295 ASSAY OF SERUM SODIUM: CPT

## 2020-04-18 PROCEDURE — 82728 ASSAY OF FERRITIN: CPT

## 2020-04-18 PROCEDURE — 82435 ASSAY OF BLOOD CHLORIDE: CPT

## 2020-04-18 RX ADMIN — ENOXAPARIN SODIUM 40 MILLIGRAM(S): 100 INJECTION SUBCUTANEOUS at 04:30

## 2020-04-18 RX ADMIN — Medication 137 MICROGRAM(S): at 04:30

## 2020-04-18 RX ADMIN — AMLODIPINE BESYLATE 10 MILLIGRAM(S): 2.5 TABLET ORAL at 04:30

## 2020-04-18 RX ADMIN — Medication 1 TABLET(S): at 12:38

## 2020-04-18 RX ADMIN — DULOXETINE HYDROCHLORIDE 60 MILLIGRAM(S): 30 CAPSULE, DELAYED RELEASE ORAL at 12:38

## 2020-04-18 RX ADMIN — Medication 50 MILLIGRAM(S): at 04:30

## 2020-04-18 NOTE — PROGRESS NOTE ADULT - SUBJECTIVE AND OBJECTIVE BOX
[facility] - [facility] - PROGRESS NOTE    CHIEF COMPLAINT: hypoxic respiratory failure; COVID-19 related pneumonia    INTERVAL HISTORY:  Review of chart reveal no new respiratory complaints.  Report of increased shortness of breath; progressively improving with decreasing nasal canula oxygen requirements; no cough, sputum production, chest congestion or wheeze; no fevers, chills or sweats; no chest pain/pressure or palpitations; good appetite; no diarrhea    REVIEW OF SYSTEMS:  Constitutional: As per interval history  HEENT: Within normal limits  CV: As per interval history  Resp: As per interval history  GI: Within normal limits   : Within normal limits  Musculoskeletal: Within normal limits  Skin: Within normal limits  Neurological: Within normal limits  Psychiatric: Within normal limits  Endocrine: Within normal limits  Hematologic/Lymphatic: Within normal limits  Allergic/Immunologic: Within normal limits    MEDICATIONS:     Pulmonary "    Anti-microbials:    Cardiovascular:  amLODIPine   Tablet 10 milliGRAM(s) Oral daily  hydrochlorothiazide 50 milliGRAM(s) Oral daily    Other:  DULoxetine 60 milliGRAM(s) Oral daily  enoxaparin Injectable 40 milliGRAM(s) SubCutaneous every 12 hours  levothyroxine 137 MICROGram(s) Oral daily  Nephro-rimma 1 Tablet(s) Oral daily    MEDICATIONS  (PRN):  acetaminophen   Tablet .. 650 milliGRAM(s) Oral every 4 hours PRN Temp greater or equal to 38.5C (101.3F)  ALBUTerol    90 MICROgram(s) HFA Inhaler 2 Puff(s) Inhalation every 4 hours PRN Shortness of Breath and/or Wheezing  benzonatate 100 milliGRAM(s) Oral three times a day PRN Cough  guaifenesin/dextromethorphan  Syrup 10 milliLiter(s) Oral every 4 hours PRN Cough  ondansetron Injectable 4 milliGRAM(s) IV Push every 6 hours PRN Nausea and/or Vomiting  sodium chloride 0.65% Nasal 1 Spray(s) Both Nostrils every 4 hours PRN Nasal Congestion        OBJECTIVE:    PHYSICAL EXAM:       ICU Vital Signs Last 24 Hrs  T(C): 36.7 (17 Apr 2020 04:24), Max: 36.7 (16 Apr 2020 13:01)  T(F): 98 (17 Apr 2020 04:24), Max: 98 (16 Apr 2020 13:01)  HR: 90 (17 Apr 2020 04:24) (85 - 96)  BP: 107/71 (17 Apr 2020 04:24) (107/71 - 123/83)  BP(mean): --  ABP: --  ABP(mean): --  RR: 18 (17 Apr 2020 04:24) (18 - 18)  SpO2: 97% (17 Apr 2020 04:24) (96% - 97%) on 6lplm nasal canula -> 98% on 4lpm nasal canula         LABS:                          8.3    7.83  )-----------( 440      ( 16 Apr 2020 06:48 )             30.2     CBC    WBC  7.83 <==, 6.21 <==, 5.52 <==    Hemoglobin  8.3 <<==, 7.6 <<==, 7.4 <<==    Hematocrit  30.2 <==, 26.5 <==, 26.3 <==    Platelets  440 <==, 325 <==, 209 <==      139  |  97  |  17  ----------------------------<  85    04-16  3.4<L>   |  27  |  0.86      LYTES    sodium  139 <==, 139 <==, 141 <==    potassium   3.4 <==, 3.8 <==, 3.0 <==    chloride  97 <==, 101 <==, 102 <==    carbon dioxide  27 <==, 29 <==, 25 <==    =============================================================================================  RENAL FUNCTION:    Creatinine:   0.86  <<==, 1.08  <<==, 0.86  <<==    BUN:   17 <==, 12 <==, 8 <==    ============================================================================================    calcium   9.2 <==, 9.1 <==, 8.6 <==    mag   2.4 <==    ============================================================================================  LFTs    AST:   32 <==     ALT:  20  <==     AP:  68  <=    Bili:  0.3  <=    D-Dimer Assay, Quantitative: <150 ng/mL DDU (04-15 @ 06:34)    D-Dimer Assay, Quantitative: <150 ng/mL DDU (04-13 @ 06:56)    D-Dimer Assay, Quantitative: 156 ng/mL DDU (04-11 @ 06:56)    Ferritin, Serum in AM (04.15.20 @ 07:55)    Ferritin, Serum: 36 ng/mL    Ferritin, Serum (04.10.20 @ 13:12)    Ferritin, Serum: 37 ng/mL    C-Reactive Protein, Serum (04.15.20 @ 07:55)    C-Reactive Protein, Serum: 2.58 mg/dL    C-Reactive Protein, Serum (04.11.20 @ 09:57)    C-Reactive Protein, Serum: 7.22 mg/dL    CARDIAC MARKERS ( 11 Apr 2020 06:56 )   U/L /CKMB x     /CKMB Units x        troponin x        MICROBIOLOGY:     COVID -19 positive 4/6/2020        RADIOLOGY:  [x ] Chest radiographs reviewed and interpreted by me      EXAM:  XR CHEST PORTABLE URGENT 1V                          PROCEDURE DATE:  04/15/2020      INTERPRETATION:  A single chest x-ray was obtained on April 15, 2020.    Indication: High oxygen requirement with COVID 19.    Impression:    The heart is normal in size. Bilateral airspace opacities is seen in both lower lobes which appears to be slightly improved when compared to previous study done April 10, 2020. Changes compatible with COVID 19 pneumonia. No pleural effusion. No acute bony pathology could be identified.    ANTONY GIFFORD M.D., ATTENDING RADIOLOGIST  This document has been electronically signed. Apr 15 2020  2:03PM  ---------------------------------------------------------------------------------------------------------------  EXAM:  XR CHEST PORTABLE URGENT 1V                          PROCEDURE DATE:  04/10/2020      INTERPRETATION:  Indication: Hypoxic. To be admitted. COVID positive.    Technique: Single portable view of the chest.    Comparison: 2/17/2016    Findings: The cardiac silhouette is normal in size. There are patchy airspace opacities bilaterally, most pronounced in the lower lobes. The hilar and mediastinal structures appear unremarkable. There are no pleural effusions.    Impression: Bilateral patchy consolidations, most pronounced in the lower lobes, concerning for viral pneumonia such as COVID.    JOSE NUNEZ M.D., ATTENDING RADIOLOGIST  This document has been electronically signed. Apr 11 2020  1:24PM  --------------------------------------------------------------------------------------------------------------- The patient was discharged prior to completing this note.  Please refer to the note by Dr. Kerns from 4-17 for details of the treatment plan from our perspective.

## 2020-04-18 NOTE — DISCHARGE NOTE PROVIDER - HOSPITAL COURSE
This is a 44 female with history of obestiy, HTN, anxiety, and hypothyroidism presenting with sepsis 2/2 covid19 diagnosed via OP swab on 4/6/20.        #Pneumonia due to 2019 novel coronavirus:    -Currently s/p 5 day course of Plaquenil     -Required oxygen supplementation during admission, saturating in the high 90s at the time of discharge        #Hypothyroid:    -Continue home dose of Synthroid        #Hypertension: patient with diastolic HTN on presentation    -Home dose of Amlodipine was uptitrated to 10mg PO daily     -Come home dose of HCTZ 50mg PO daily         #Dispo:    -Stable for discharge home today per discussion with hospitalist. This is a 44 female with history of obestiy, HTN, anxiety, and hypothyroidism presenting with sepsis 2/2 covid19 diagnosed via OP swab on 4/6/20.        #Pneumonia due to 2019 novel coronavirus:    -Currently s/p 5 day course of Plaquenil     -Required oxygen supplementation during admission, saturating in the high 90s at the time of discharge        #Hypothyroid:    -Continue home dose of Synthroid        #Hypertension: patient with diastolic HTN on presentation    -Home dose of Amlodipine was uptitrated to 10mg PO daily;     -Come home dose of HCTZ 50mg PO daily         #Dispo:    -Stable for discharge home today per discussion with hospitalist. This is a 44 female with history of obestiy, HTN, anxiety, and hypothyroidism presenting with sepsis 2/2 covid19 diagnosed via OP swab on 4/6/20.        #Pneumonia due to 2019 novel coronavirus:    -Currently s/p 5 day course of Plaquenil     -Required oxygen supplementation during admission, saturating in the high 90s at the time of discharge        #Hypothyroid:    -Continue home dose of Synthroid        #Hypertension: patient with diastolic HTN on presentation    -Home dose of Amlodipine was uptitrated to 10mg PO daily during admission; patient to resume home dose at discharge    -Continue home dose of HCTZ 50mg PO daily at discharge         #PPx:    -Was on Lovenox during admission; d-dimer trending down, no need for Lovenox at discharge        #Dispo:    -Stable for discharge home today per discussion with hospitalist.

## 2020-04-18 NOTE — PROGRESS NOTE ADULT - SUBJECTIVE AND OBJECTIVE BOX
Patient is a 44y old  Female who presents with a chief complaint of shortness of breath (18 Apr 2020 10:30)      SUBJECTIVE / OVERNIGHT EVENTS:  No chest pain. No shortness of breath. No complaints. No events overnight. off supplemental O2 saturating above 92%    MEDICATIONS  (STANDING):  amLODIPine   Tablet 10 milliGRAM(s) Oral daily  DULoxetine 60 milliGRAM(s) Oral daily  enoxaparin Injectable 40 milliGRAM(s) SubCutaneous every 12 hours  hydrochlorothiazide 50 milliGRAM(s) Oral daily  levothyroxine 137 MICROGram(s) Oral daily  Nephro-rimma 1 Tablet(s) Oral daily    MEDICATIONS  (PRN):  acetaminophen   Tablet .. 650 milliGRAM(s) Oral every 4 hours PRN Temp greater or equal to 38.5C (101.3F)  ALBUTerol    90 MICROgram(s) HFA Inhaler 2 Puff(s) Inhalation every 4 hours PRN Shortness of Breath and/or Wheezing  benzonatate 100 milliGRAM(s) Oral three times a day PRN Cough  guaifenesin/dextromethorphan  Syrup 10 milliLiter(s) Oral every 4 hours PRN Cough  ondansetron Injectable 4 milliGRAM(s) IV Push every 6 hours PRN Nausea and/or Vomiting  sodium chloride 0.65% Nasal 1 Spray(s) Both Nostrils every 4 hours PRN Nasal Congestion      Vital Signs Last 24 Hrs  T(C): 36.8 (18 Apr 2020 07:55), Max: 36.9 (17 Apr 2020 22:18)  T(F): 98.2 (18 Apr 2020 07:55), Max: 98.5 (17 Apr 2020 22:18)  HR: 98 (18 Apr 2020 07:55) (91 - 99)  BP: 104/75 (18 Apr 2020 07:55) (104/73 - 118/82)  BP(mean): --  RR: 18 (18 Apr 2020 07:55) (16 - 18)  SpO2: 97% (18 Apr 2020 07:55) (93% - 98%)  CAPILLARY BLOOD GLUCOSE        I&O's Summary    17 Apr 2020 07:01  -  18 Apr 2020 07:00  --------------------------------------------------------  IN: 0 mL / OUT: 900 mL / NET: -900 mL          LABS:                        8.1    6.52  )-----------( 516      ( 18 Apr 2020 07:23 )             29.9     04-18    139  |  99  |  14  ----------------------------<  81  3.6   |  26  |  0.81    Ca    9.3      18 Apr 2020 07:23  Phos  3.1     04-18  Mg     2.3     04-18    TPro  7.2  /  Alb  3.4  /  TBili  0.2  /  DBili  x   /  AST  33  /  ALT  47<H>  /  AlkPhos  91  04-18              RADIOLOGY & ADDITIONAL TESTS:    Imaging Personally Reviewed:    Consultant(s) Notes Reviewed:      Care Discussed with Consultants/Other Providers:

## 2020-04-18 NOTE — PROGRESS NOTE ADULT - ASSESSMENT
ASSESSMENT:    acute hypoxic respiratory failure due to COVID-19 related pneumonia - although these patient's are at high risk for clot formation, the non-elevated D-dimer level essentially excludes the diagnosis of VTE disease    PLAN/RECOMMENDATIONS:    oxygen supplementation via a nasal canula to keep saturation greater than 92% - taper as tolerated  normal saline nose gtts  has completed a 5 day course of Plaquenil  no indication for systemic steroids or cytokine inhibitors given clinical improvement  CXR reviewed - improved bilateral infiltrates  tessalon/robitussin DM as needed  albuterol MDI as needed  norvasc/HCTZ  diligent DVT prophylxis  Discharge planning in progress, hopefully she will be able to be discharged without supplemental O2 if she keeps improving        Nikolay Nye MD, [facility]  [phone]  Pulmonary Medicine Nikolay Nye MD, [facility]  [phone]  Pulmonary Medicine

## 2020-04-18 NOTE — DISCHARGE NOTE NURSING/CASE MANAGEMENT/SOCIAL WORK - PATIENT PORTAL LINK FT
You can access the FollowMyHealth Patient Portal offered by [facility] by registering at the following website: [email or web address]. By joining [employer]’s FollowMyHealth portal, you will also be able to view your health information using other applications (apps) compatible with our system.

## 2020-04-18 NOTE — DISCHARGE NOTE PROVIDER - NSDCCPCAREPLAN_GEN_ALL_CORE_FT
PRINCIPAL DISCHARGE DIAGNOSIS  Diagnosis: Hypoxia  Assessment and Plan of Treatment:       SECONDARY DISCHARGE DIAGNOSES  Diagnosis: Pneumonia due to 2019 novel coronavirus  Assessment and Plan of Treatment: PRINCIPAL DISCHARGE DIAGNOSIS  Diagnosis: Pneumonia due to 2019 novel coronavirus  Assessment and Plan of Treatment: You have tested POSITIVE for the novel coronavirus (COVID-19). Upon discharge, you must self-quarantine for 14 days, or until the Department of Health contacts you. Please wear a face mask if you are around other individuals. Try to avoid contact with house members, family, and friends for the duration of this quarantine. Please follow up with your primary care physician within 2-3 weeks of your discharge from [facility]. Please take all medications as prescribed. If you experience any worsening or recurrence of your symptoms, particularly worsening or high fever, shortness of breathe, extreme fatigue, or bloody cough please call 9-1-1 immediately or report to the nearest Emergency Department. If you have any questions or concerns, please do not hesitate to call the hospital at [phone]. PRINCIPAL DISCHARGE DIAGNOSIS  Diagnosis: Pneumonia due to 2019 novel coronavirus  Assessment and Plan of Treatment: You have tested POSITIVE for the novel coronavirus (COVID-19). Upon discharge, you must self-quarantine for 14 days, or until the Department of Health contacts you. Please wear a face mask if you are around other individuals. Try to avoid contact with house members, family, and friends for the duration of this quarantine. Please follow up with your primary care physician within 2-3 weeks of your discharge from [facility]. Please take all medications as prescribed. If you experience any worsening or recurrence of your symptoms, particularly worsening or high fever, shortness of breathe, extreme fatigue, or bloody cough please call 9-1-1 immediately or report to the nearest Emergency Department. If you have any questions or concerns, please do not hesitate to call the hospital at [phone].

## 2020-04-18 NOTE — DISCHARGE NOTE PROVIDER - CARE PROVIDER_API CALL
Mary Barajas)  Internal Medicine  [address] [place] [address]  Phone: [phone]  Fax: [phone]  Established Patient  Follow Up Time: 1 week

## 2020-04-18 NOTE — PROGRESS NOTE ADULT - ASSESSMENT
44f hx obestiy, HTN, anxiety, hypothyroidism presenting with sepsis 2/2 covid19 diagnosed via OP swab on 4/6/20     Problem/Plan - 1:  ·  Problem: Pneumonia due to 2019 novel coronavirus.  Plan: with hypoxia , improved with supplemental O2  - completed plaquenil 5 day protocol, qtc < 500  - o2 supplementation as needed-   - avoid HFNC/bilevel- if needs o2 beyond NRB, would prone patient and call MICU consult for intubation  - per ID consult no other meds at this time  - supportive care   -titrate oxygen as tolerated   -trend inflammatory markers every few days or so - ferritin, CRP and D dimer   -would not initiate steroids or cytokine inhibitors        Problem/Plan - 2:  ·  Problem: Hypothyroidism.  Plan: continue synthroid.      Problem/Plan - 3:  ·  Problem: Hypertension.  Plan: Pt with diastolic HTN on presentation  - continue hctz 50mg po daily with hold parameters  - dec amlodipine  5mg po daily     can be discharged once off supplemental O2

## 2020-04-18 NOTE — DISCHARGE NOTE PROVIDER - NSDCMRMEDTOKEN_GEN_ALL_CORE_FT
amLODIPine 5 mg oral tablet: 1 tab(s) orally once a day  Cymbalta 60 mg oral delayed release capsule: 1 cap(s) orally once a day  hydroCHLOROthiazide 50 mg oral tablet: 1 tab(s) orally once a day  multivitamin:   Synthroid 137 mcg (0.137 mg) oral tablet: 1 tab(s) orally once a day  Tylenol:

## 2020-04-18 NOTE — PROGRESS NOTE ADULT - NSREFPHYEXREFTO_GEN_ALL_CORE
ED Physical Exam
Inpatient Physical Exam

## 2020-04-25 ENCOUNTER — MESSAGE (OUTPATIENT)
Age: 45
End: 2020-04-25

## 2020-07-20 ENCOUNTER — APPOINTMENT (OUTPATIENT)
Dept: UROLOGY | Facility: CLINIC | Age: 45
End: 2020-07-20
Payer: COMMERCIAL

## 2020-07-20 VITALS
HEIGHT: 62 IN | RESPIRATION RATE: 14 BRPM | BODY MASS INDEX: 31.83 KG/M2 | WEIGHT: 173 LBS | DIASTOLIC BLOOD PRESSURE: 99 MMHG | TEMPERATURE: 98 F | SYSTOLIC BLOOD PRESSURE: 143 MMHG | OXYGEN SATURATION: 100 % | HEART RATE: 81 BPM

## 2020-07-20 DIAGNOSIS — Z87.440 PERSONAL HISTORY OF URINARY (TRACT) INFECTIONS: ICD-10-CM

## 2020-07-20 PROCEDURE — 99213 OFFICE O/P EST LOW 20 MIN: CPT

## 2020-07-20 RX ORDER — PREDNISONE 20 MG/1
20 TABLET ORAL
Qty: 16 | Refills: 0 | Status: DISCONTINUED | COMMUNITY
Start: 2018-10-03 | End: 2020-07-20

## 2020-07-20 RX ORDER — TRAMADOL HYDROCHLORIDE 100 MG/1
100 TABLET, EXTENDED RELEASE ORAL
Qty: 60 | Refills: 0 | Status: DISCONTINUED | COMMUNITY
Start: 2018-10-02 | End: 2020-07-20

## 2020-07-20 RX ORDER — MELOXICAM 15 MG/1
15 TABLET ORAL
Qty: 30 | Refills: 1 | Status: DISCONTINUED | COMMUNITY
Start: 2019-09-23 | End: 2020-07-20

## 2020-07-20 RX ORDER — TOPIRAMATE 25 MG/1
25 CAPSULE, EXTENDED RELEASE ORAL
Qty: 30 | Refills: 0 | Status: DISCONTINUED | COMMUNITY
Start: 2018-09-05 | End: 2020-07-20

## 2020-07-20 RX ORDER — CYCLOBENZAPRINE HYDROCHLORIDE 5 MG/1
5 TABLET, FILM COATED ORAL 3 TIMES DAILY
Qty: 90 | Refills: 1 | Status: DISCONTINUED | COMMUNITY
Start: 2019-05-16 | End: 2020-07-20

## 2020-07-20 RX ORDER — OXYCODONE 5 MG/1
5 TABLET ORAL
Qty: 12 | Refills: 0 | Status: DISCONTINUED | COMMUNITY
Start: 2019-02-20 | End: 2020-07-20

## 2020-07-20 RX ORDER — TOPIRAMATE 50 MG/1
50 CAPSULE, EXTENDED RELEASE ORAL
Qty: 30 | Refills: 0 | Status: DISCONTINUED | COMMUNITY
Start: 2018-09-05 | End: 2020-07-20

## 2020-07-20 RX ORDER — DIAZEPAM 5 MG/1
5 TABLET ORAL
Qty: 12 | Refills: 0 | Status: DISCONTINUED | COMMUNITY
Start: 2018-10-03 | End: 2020-07-20

## 2020-07-20 RX ORDER — OXYCODONE AND ACETAMINOPHEN 5; 325 MG/1; MG/1
5-325 TABLET ORAL
Qty: 56 | Refills: 0 | Status: DISCONTINUED | COMMUNITY
Start: 2019-05-16 | End: 2020-07-20

## 2020-07-20 RX ORDER — DIAZEPAM 10 MG/1
10 TABLET ORAL
Qty: 1 | Refills: 0 | Status: DISCONTINUED | COMMUNITY
Start: 2019-02-12 | End: 2020-07-20

## 2020-07-20 RX ORDER — DICLOFENAC SODIUM 75 MG/1
75 TABLET, DELAYED RELEASE ORAL
Qty: 60 | Refills: 0 | Status: DISCONTINUED | COMMUNITY
Start: 2019-02-22 | End: 2020-07-20

## 2020-07-20 RX ORDER — METFORMIN HYDROCHLORIDE 500 MG/1
500 TABLET, COATED ORAL
Qty: 60 | Refills: 0 | Status: DISCONTINUED | COMMUNITY
Start: 2019-02-14 | End: 2020-07-20

## 2020-07-20 RX ORDER — OXYCODONE AND ACETAMINOPHEN 5; 325 MG/1; MG/1
5-325 TABLET ORAL
Qty: 20 | Refills: 0 | Status: DISCONTINUED | COMMUNITY
Start: 2018-10-03 | End: 2020-07-20

## 2020-07-20 NOTE — HISTORY OF PRESENT ILLNESS
[FreeTextEntry1] : She is a 45-year-old woman who is seen today in followup. She has been using Keflex 250 mg as a post coital prophylaxis with success until she run out of it. About 2 weeks ago again, she developed lower abdominal pressure and urinary frequency and urgency after sexual activity. Primary care physician gave her Ceftin. Also fluconazole was given. Symptoms are slowly resolving. There was no fever or flank pain. Urine culture in 2019 was normal and ultrasound showed normal kidneys and bladder. Residual urine today was minimal.\par Previous notes: She was on nitrofurantoin post coital prophylaxis and had not had any recurrent infections. Urine culture in 2018 showed Escherichia coli and in 2018 Klebsiella. She has undergone 2  sections in the past. There is no incontinence. According to the patient, cystoscopy and renal ultrasound were done a few years ago.

## 2020-07-20 NOTE — ASSESSMENT
[FreeTextEntry1] : Keflex was refilled, urine culture was refilled. Stay hydrated. Also fluconazole was refilled. Renal Us was reviewed.

## 2020-07-20 NOTE — PHYSICAL EXAM
[General Appearance - Well Developed] : well developed [General Appearance - Well Nourished] : well nourished [Normal Appearance] : normal appearance [Well Groomed] : well groomed [General Appearance - In No Acute Distress] : no acute distress [Abdomen Soft] : soft [Abdomen Tenderness] : non-tender [Costovertebral Angle Tenderness] : no ~M costovertebral angle tenderness [FreeTextEntry1] : Bladder not distended. [] : no respiratory distress [Respiration, Rhythm And Depth] : normal respiratory rhythm and effort [Exaggerated Use Of Accessory Muscles For Inspiration] : no accessory muscle use [Oriented To Time, Place, And Person] : oriented to person, place, and time [Affect] : the affect was normal [Mood] : the mood was normal [Not Anxious] : not anxious

## 2020-07-23 LAB
APPEARANCE: CLEAR
BACTERIA UR CULT: ABNORMAL
BACTERIA: NEGATIVE
BILIRUBIN URINE: NEGATIVE
BLOOD URINE: ABNORMAL
COLOR: NORMAL
GLUCOSE QUALITATIVE U: NEGATIVE
HYALINE CASTS: 1 /LPF
KETONES URINE: NEGATIVE
LEUKOCYTE ESTERASE URINE: NEGATIVE
MICROSCOPIC-UA: NORMAL
NITRITE URINE: NEGATIVE
PH URINE: 6.5
PROTEIN URINE: NORMAL
RED BLOOD CELLS URINE: 3 /HPF
SPECIFIC GRAVITY URINE: 1.02
SQUAMOUS EPITHELIAL CELLS: 5 /HPF
UROBILINOGEN URINE: NORMAL
WHITE BLOOD CELLS URINE: 2 /HPF

## 2020-10-09 NOTE — ED ADULT NURSE NOTE - NS ED NOTE  TALK SOMEONE YN
IMPRESSION:  SOB 2/2 Breast Ca w/ mets to Lung and bone (on gefitinib and high-dose tamoxifen)  H/o PE (09/2020)  Hypo-osmolar hypovolemic Hyponatremia- resolved  6L home oxygen    SUGGEST:  keep pox 90-94%   bipap at night and prn when she sleep  taper oxygen for above pox   dvt prophylaxis   comfort care   case discussed with hospitalist and family at bedside        No

## 2021-03-09 ENCOUNTER — APPOINTMENT (OUTPATIENT)
Dept: INTERNAL MEDICINE | Facility: CLINIC | Age: 46
End: 2021-03-09
Payer: COMMERCIAL

## 2021-03-09 ENCOUNTER — LABORATORY RESULT (OUTPATIENT)
Age: 46
End: 2021-03-09

## 2021-03-09 VITALS
BODY MASS INDEX: 31.65 KG/M2 | HEART RATE: 77 BPM | SYSTOLIC BLOOD PRESSURE: 134 MMHG | OXYGEN SATURATION: 96 % | HEIGHT: 62 IN | DIASTOLIC BLOOD PRESSURE: 91 MMHG | RESPIRATION RATE: 16 BRPM | TEMPERATURE: 97.9 F | WEIGHT: 172 LBS

## 2021-03-09 DIAGNOSIS — B00.9 HERPESVIRAL INFECTION, UNSPECIFIED: ICD-10-CM

## 2021-03-09 DIAGNOSIS — Z87.59 PERSONAL HISTORY OF OTHER COMPLICATIONS OF PREGNANCY, CHILDBIRTH AND THE PUERPERIUM: ICD-10-CM

## 2021-03-09 DIAGNOSIS — Z80.52 FAMILY HISTORY OF MALIGNANT NEOPLASM OF BLADDER: ICD-10-CM

## 2021-03-09 DIAGNOSIS — M51.26 OTHER INTERVERTEBRAL DISC DISPLACEMENT, LUMBAR REGION: ICD-10-CM

## 2021-03-09 DIAGNOSIS — S80.02XA CONTUSION OF LEFT KNEE, INITIAL ENCOUNTER: ICD-10-CM

## 2021-03-09 DIAGNOSIS — M22.2X9 PATELLOFEMORAL DISORDERS, UNSPECIFIED KNEE: ICD-10-CM

## 2021-03-09 DIAGNOSIS — Z87.39 PERSONAL HISTORY OF OTHER DISEASES OF THE MUSCULOSKELETAL SYSTEM AND CONNECTIVE TISSUE: ICD-10-CM

## 2021-03-09 PROCEDURE — G0442 ANNUAL ALCOHOL SCREEN 15 MIN: CPT | Mod: 59

## 2021-03-09 PROCEDURE — G0444 DEPRESSION SCREEN ANNUAL: CPT | Mod: 59

## 2021-03-09 PROCEDURE — 99072 ADDL SUPL MATRL&STAF TM PHE: CPT

## 2021-03-09 PROCEDURE — 99205 OFFICE O/P NEW HI 60 MIN: CPT | Mod: 25

## 2021-03-09 RX ORDER — MELOXICAM 15 MG/1
15 TABLET ORAL
Qty: 30 | Refills: 1 | Status: DISCONTINUED | COMMUNITY
Start: 2019-05-16 | End: 2021-03-09

## 2021-03-09 RX ORDER — FLUCONAZOLE 150 MG/1
150 TABLET ORAL
Qty: 1 | Refills: 3 | Status: DISCONTINUED | COMMUNITY
Start: 2018-12-09 | End: 2021-03-09

## 2021-03-09 RX ORDER — VALACYCLOVIR 1 G/1
1 TABLET, FILM COATED ORAL
Refills: 0 | Status: ACTIVE | COMMUNITY
Start: 2018-09-07

## 2021-03-09 RX ORDER — ZOLMITRIPTAN 5 MG/1
5 TABLET, FILM COATED ORAL DAILY
Qty: 9 | Refills: 0 | Status: DISCONTINUED | COMMUNITY
Start: 2018-08-22 | End: 2021-03-09

## 2021-03-09 RX ORDER — TRANEXAMIC ACID 650 MG/1
650 TABLET ORAL
Qty: 60 | Refills: 0 | Status: DISCONTINUED | COMMUNITY
Start: 2018-09-07 | End: 2021-03-09

## 2021-03-10 ENCOUNTER — APPOINTMENT (OUTPATIENT)
Dept: INTERNAL MEDICINE | Facility: CLINIC | Age: 46
End: 2021-03-10

## 2021-03-10 LAB
ALBUMIN SERPL ELPH-MCNC: 4.5 G/DL
ALP BLD-CCNC: 64 U/L
ALT SERPL-CCNC: 10 U/L
ANION GAP SERPL CALC-SCNC: 12 MMOL/L
AST SERPL-CCNC: 18 U/L
BASOPHILS # BLD AUTO: 0.12 K/UL
BASOPHILS NFR BLD AUTO: 1.8 %
BILIRUB SERPL-MCNC: 0.3 MG/DL
BUN SERPL-MCNC: 22 MG/DL
CALCIUM SERPL-MCNC: 9.2 MG/DL
CHLORIDE SERPL-SCNC: 99 MMOL/L
CO2 SERPL-SCNC: 27 MMOL/L
CREAT SERPL-MCNC: 0.86 MG/DL
EOSINOPHIL # BLD AUTO: 0.17 K/UL
EOSINOPHIL NFR BLD AUTO: 2.5 %
GLUCOSE SERPL-MCNC: 68 MG/DL
HCT VFR BLD CALC: 27.3 %
HGB BLD-MCNC: 7.6 G/DL
IMM GRANULOCYTES NFR BLD AUTO: 0.4 %
LYMPHOCYTES # BLD AUTO: 1.86 K/UL
LYMPHOCYTES NFR BLD AUTO: 27.4 %
MAN DIFF?: NORMAL
MCHC RBC-ENTMCNC: 17.9 PG
MCHC RBC-ENTMCNC: 27.8 GM/DL
MCV RBC AUTO: 64.4 FL
MONOCYTES # BLD AUTO: 0.51 K/UL
MONOCYTES NFR BLD AUTO: 7.5 %
NEUTROPHILS # BLD AUTO: 4.09 K/UL
NEUTROPHILS NFR BLD AUTO: 60.4 %
PLATELET # BLD AUTO: 407 K/UL
POTASSIUM SERPL-SCNC: 4.2 MMOL/L
PROT SERPL-MCNC: 7.7 G/DL
RBC # BLD: 4.24 M/UL
RBC # FLD: 20.2 %
SODIUM SERPL-SCNC: 138 MMOL/L
T4 FREE SERPL-MCNC: 1 NG/DL
TSH SERPL-ACNC: 20 UIU/ML
WBC # FLD AUTO: 6.78 K/UL

## 2021-03-15 ENCOUNTER — APPOINTMENT (OUTPATIENT)
Dept: ORTHOPEDIC SURGERY | Facility: CLINIC | Age: 46
End: 2021-03-15
Payer: COMMERCIAL

## 2021-03-15 PROCEDURE — 72100 X-RAY EXAM L-S SPINE 2/3 VWS: CPT

## 2021-03-15 PROCEDURE — 99072 ADDL SUPL MATRL&STAF TM PHE: CPT

## 2021-03-15 PROCEDURE — 99214 OFFICE O/P EST MOD 30 MIN: CPT

## 2021-04-23 ENCOUNTER — APPOINTMENT (OUTPATIENT)
Dept: CARDIOLOGY | Facility: CLINIC | Age: 46
End: 2021-04-23

## 2021-05-03 ENCOUNTER — APPOINTMENT (OUTPATIENT)
Dept: INTERNAL MEDICINE | Facility: CLINIC | Age: 46
End: 2021-05-03
Payer: COMMERCIAL

## 2021-05-03 VITALS
SYSTOLIC BLOOD PRESSURE: 130 MMHG | WEIGHT: 170 LBS | TEMPERATURE: 97.8 F | BODY MASS INDEX: 31.28 KG/M2 | OXYGEN SATURATION: 100 % | HEART RATE: 82 BPM | HEIGHT: 62 IN | RESPIRATION RATE: 17 BRPM | DIASTOLIC BLOOD PRESSURE: 82 MMHG

## 2021-05-03 DIAGNOSIS — Z76.89 PERSONS ENCOUNTERING HEALTH SERVICES IN OTHER SPECIFIED CIRCUMSTANCES: ICD-10-CM

## 2021-05-03 DIAGNOSIS — R09.89 OTHER SPECIFIED SYMPTOMS AND SIGNS INVOLVING THE CIRCULATORY AND RESPIRATORY SYSTEMS: ICD-10-CM

## 2021-05-03 DIAGNOSIS — D50.9 IRON DEFICIENCY ANEMIA, UNSPECIFIED: ICD-10-CM

## 2021-05-03 PROCEDURE — 99072 ADDL SUPL MATRL&STAF TM PHE: CPT

## 2021-05-03 PROCEDURE — 99214 OFFICE O/P EST MOD 30 MIN: CPT

## 2021-05-04 LAB
ALBUMIN SERPL ELPH-MCNC: 4.5 G/DL
ALP BLD-CCNC: 65 U/L
ALT SERPL-CCNC: 11 U/L
ANION GAP SERPL CALC-SCNC: 16 MMOL/L
AST SERPL-CCNC: 21 U/L
BASOPHILS # BLD AUTO: 0.1 K/UL
BASOPHILS NFR BLD AUTO: 1.5 %
BILIRUB SERPL-MCNC: 0.2 MG/DL
BUN SERPL-MCNC: 18 MG/DL
CALCIUM SERPL-MCNC: 9.9 MG/DL
CHLORIDE SERPL-SCNC: 102 MMOL/L
CO2 SERPL-SCNC: 23 MMOL/L
CREAT SERPL-MCNC: 0.96 MG/DL
EOSINOPHIL # BLD AUTO: 0.25 K/UL
EOSINOPHIL NFR BLD AUTO: 3.8 %
FERRITIN SERPL-MCNC: 11 NG/ML
GLUCOSE SERPL-MCNC: 88 MG/DL
HCT VFR BLD CALC: 30.1 %
HGB BLD-MCNC: 8.3 G/DL
IMM GRANULOCYTES NFR BLD AUTO: 0.2 %
IRON SATN MFR SERPL: 6 %
IRON SERPL-MCNC: 28 UG/DL
LYMPHOCYTES # BLD AUTO: 1.65 K/UL
LYMPHOCYTES NFR BLD AUTO: 25.1 %
MAN DIFF?: NORMAL
MCHC RBC-ENTMCNC: 18.4 PG
MCHC RBC-ENTMCNC: 27.6 GM/DL
MCV RBC AUTO: 66.6 FL
MONOCYTES # BLD AUTO: 0.46 K/UL
MONOCYTES NFR BLD AUTO: 7 %
NEUTROPHILS # BLD AUTO: 4.11 K/UL
NEUTROPHILS NFR BLD AUTO: 62.4 %
PLATELET # BLD AUTO: 325 K/UL
POTASSIUM SERPL-SCNC: 3 MMOL/L
PROT SERPL-MCNC: 7.8 G/DL
RBC # BLD: 4.52 M/UL
RBC # FLD: 23.3 %
SODIUM SERPL-SCNC: 141 MMOL/L
T4 FREE SERPL-MCNC: 0.8 NG/DL
THYROPEROXIDASE AB SERPL IA-ACNC: <10 IU/ML
TIBC SERPL-MCNC: 501 UG/DL
TRANSFERRIN SERPL-MCNC: 402 MG/DL
TSH SERPL-ACNC: 44.6 UIU/ML
UIBC SERPL-MCNC: 473 UG/DL
WBC # FLD AUTO: 6.58 K/UL

## 2021-05-04 RX ORDER — FERROUS SULFATE 325(65) MG
325 (65 FE) TABLET ORAL
Qty: 30 | Refills: 1 | Status: ACTIVE | COMMUNITY
Start: 2021-03-11 | End: 1900-01-01

## 2021-05-06 LAB — TSH RECEPTOR AB: <1.1 IU/L

## 2021-05-13 ENCOUNTER — OUTPATIENT (OUTPATIENT)
Dept: OUTPATIENT SERVICES | Facility: HOSPITAL | Age: 46
LOS: 1 days | End: 2021-05-13
Payer: COMMERCIAL

## 2021-05-13 ENCOUNTER — APPOINTMENT (OUTPATIENT)
Dept: ULTRASOUND IMAGING | Facility: CLINIC | Age: 46
End: 2021-05-13
Payer: COMMERCIAL

## 2021-05-13 DIAGNOSIS — Z00.8 ENCOUNTER FOR OTHER GENERAL EXAMINATION: ICD-10-CM

## 2021-05-13 DIAGNOSIS — Z98.51 TUBAL LIGATION STATUS: Chronic | ICD-10-CM

## 2021-05-13 PROCEDURE — 76536 US EXAM OF HEAD AND NECK: CPT

## 2021-05-13 PROCEDURE — 76536 US EXAM OF HEAD AND NECK: CPT | Mod: 26

## 2021-05-18 ENCOUNTER — TRANSCRIPTION ENCOUNTER (OUTPATIENT)
Age: 46
End: 2021-05-18

## 2021-06-02 ENCOUNTER — APPOINTMENT (OUTPATIENT)
Dept: ENDOCRINOLOGY | Facility: CLINIC | Age: 46
End: 2021-06-02
Payer: COMMERCIAL

## 2021-06-02 ENCOUNTER — OUTPATIENT (OUTPATIENT)
Dept: OUTPATIENT SERVICES | Facility: HOSPITAL | Age: 46
LOS: 1 days | Discharge: HOME | End: 2021-06-02

## 2021-06-02 VITALS
BODY MASS INDEX: 31.1 KG/M2 | DIASTOLIC BLOOD PRESSURE: 123 MMHG | SYSTOLIC BLOOD PRESSURE: 155 MMHG | OXYGEN SATURATION: 99 % | HEIGHT: 62 IN | HEART RATE: 90 BPM | WEIGHT: 169 LBS

## 2021-06-02 DIAGNOSIS — Z98.51 TUBAL LIGATION STATUS: Chronic | ICD-10-CM

## 2021-06-02 DIAGNOSIS — E89.0 POSTPROCEDURAL HYPOTHYROIDISM: ICD-10-CM

## 2021-06-02 DIAGNOSIS — D64.9 ANEMIA, UNSPECIFIED: ICD-10-CM

## 2021-06-02 PROCEDURE — 99204 OFFICE O/P NEW MOD 45 MIN: CPT

## 2021-06-02 PROCEDURE — 99214 OFFICE O/P EST MOD 30 MIN: CPT

## 2021-06-02 PROCEDURE — 99072 ADDL SUPL MATRL&STAF TM PHE: CPT

## 2021-06-02 RX ORDER — CYCLOBENZAPRINE HYDROCHLORIDE 5 MG/1
5 TABLET, FILM COATED ORAL
Qty: 30 | Refills: 0 | Status: DISCONTINUED | COMMUNITY
Start: 2021-03-15 | End: 2021-06-02

## 2021-06-03 ENCOUNTER — APPOINTMENT (OUTPATIENT)
Dept: HEMATOLOGY ONCOLOGY | Facility: CLINIC | Age: 46
End: 2021-06-03
Payer: COMMERCIAL

## 2021-06-03 ENCOUNTER — RESULT REVIEW (OUTPATIENT)
Age: 46
End: 2021-06-03

## 2021-06-03 VITALS
SYSTOLIC BLOOD PRESSURE: 142 MMHG | WEIGHT: 171.74 LBS | BODY MASS INDEX: 30.81 KG/M2 | RESPIRATION RATE: 15 BRPM | OXYGEN SATURATION: 99 % | HEIGHT: 62.5 IN | TEMPERATURE: 97 F | HEART RATE: 73 BPM | DIASTOLIC BLOOD PRESSURE: 96 MMHG

## 2021-06-03 PROBLEM — E89.0 POSTABLATIVE HYPOTHYROIDISM: Status: ACTIVE | Noted: 2021-06-03

## 2021-06-03 LAB
BASOPHILS # BLD AUTO: 0.09 K/UL — SIGNIFICANT CHANGE UP (ref 0–0.2)
BASOPHILS NFR BLD AUTO: 1.4 % — SIGNIFICANT CHANGE UP (ref 0–2)
EOSINOPHIL # BLD AUTO: 0.3 K/UL — SIGNIFICANT CHANGE UP (ref 0–0.5)
EOSINOPHIL NFR BLD AUTO: 4.6 % — SIGNIFICANT CHANGE UP (ref 0–6)
HCT VFR BLD CALC: 27.9 % — LOW (ref 34.5–45)
HGB BLD-MCNC: 8.2 G/DL — LOW (ref 11.5–15.5)
IMM GRANULOCYTES NFR BLD AUTO: 1.1 % — SIGNIFICANT CHANGE UP (ref 0–1.5)
LYMPHOCYTES # BLD AUTO: 1.23 K/UL — SIGNIFICANT CHANGE UP (ref 1–3.3)
LYMPHOCYTES # BLD AUTO: 18.8 % — SIGNIFICANT CHANGE UP (ref 13–44)
MCHC RBC-ENTMCNC: 19.1 PG — LOW (ref 27–34)
MCHC RBC-ENTMCNC: 29.4 G/DL — LOW (ref 32–36)
MCV RBC AUTO: 65 FL — LOW (ref 80–100)
MONOCYTES # BLD AUTO: 0.5 K/UL — SIGNIFICANT CHANGE UP (ref 0–0.9)
MONOCYTES NFR BLD AUTO: 7.6 % — SIGNIFICANT CHANGE UP (ref 2–14)
NEUTROPHILS # BLD AUTO: 4.36 K/UL — SIGNIFICANT CHANGE UP (ref 1.8–7.4)
NEUTROPHILS NFR BLD AUTO: 66.5 % — SIGNIFICANT CHANGE UP (ref 43–77)
NRBC # BLD: 0 /100 WBCS — SIGNIFICANT CHANGE UP (ref 0–0)
PLATELET # BLD AUTO: 276 K/UL — SIGNIFICANT CHANGE UP (ref 150–400)
RBC # BLD: 4.29 M/UL — SIGNIFICANT CHANGE UP (ref 3.8–5.2)
RBC # FLD: 21.2 % — HIGH (ref 10.3–14.5)
WBC # BLD: 6.55 K/UL — SIGNIFICANT CHANGE UP (ref 3.8–10.5)
WBC # FLD AUTO: 6.55 K/UL — SIGNIFICANT CHANGE UP (ref 3.8–10.5)

## 2021-06-03 PROCEDURE — 99205 OFFICE O/P NEW HI 60 MIN: CPT

## 2021-06-03 PROCEDURE — 99072 ADDL SUPL MATRL&STAF TM PHE: CPT

## 2021-06-03 NOTE — HISTORY OF PRESENT ILLNESS
[FreeTextEntry1] : Ms. 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 \par \par Pt had graves in  (at the time lost weight, LUZ ELENA, tremors, felt hot). She was never on MMI, immediately received ELMORE ablation (no steroid pre treatment). Within a few months of ELMORE, she was started on LT replacement. Pt was on 125mcg (), then 137mcg (2021)and now she was increased by PCP to 150mcg in May 2021 after her TSH was initially 20 in [facility] and then 2 months later in May was 44. Patient was on 137mcg between march and early may. Pt has h/o of skipping LT4 or forgetting. She notes that she was told previously by another provider that if she ever forgets to take LT4, then to skip taking it that day and just take it the next day. So she notes there were days where she just did not take it. Now she takes LT4 at 6am and then rest of meds at 9am. She is also on oral iron which she takes in the evening. She denies temperature intolerance, vision changes, tremors. \par \par \par ROS: constipation, twice a week has bowel movements. \par FH: no thyroid disease \par SH: finished LPN, nursing. 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

## 2021-06-03 NOTE — REVIEW OF SYSTEMS
[As Noted in HPI] : as noted in HPI [Negative] : Endocrine [All other systems negative] : All other systems negative

## 2021-06-03 NOTE — ASSESSMENT
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 \par \par Given recent TSH of 44, patient notes she has been more compliant with her LT4 150mcg dose. Her weight based dose is ~125mcg. I advised that we can continue the 150mcg for now, will repeat TSH in mid july. Will adjust LT4 at that time if necessary. Then repeat TSH again after 3 months in october. We discussed that iron should be taken at least 6 hours separately from LT4. We discussed that taking too much LT4 is also dangerous and symptoms of hyperthyroidism can recur. Will also check for Celiac disease. \par \par Patient verbalized understanding of the above. All questions were answered to patient's satisfaction.\par Dispo: Patient will follow up in 6 months. Discuss blood work in mid july. \par

## 2021-06-03 NOTE — ASSESSMENT
[FreeTextEntry1] : 47yo F w/ HTN, hypothyroidism here for evaluation of iron deficiency anemia.\par She is on oral iron supplementation since March. Hgb today is 8.2, not significantly improved. We discussed IV iron w/ Fereheme weekly x2. Side effects reviewed, informed consent obtained. \par She will f/u w/ GI and GYN as scheduled\par RTC 3 mo

## 2021-06-03 NOTE — PHYSICAL EXAM
[Alert] : alert [Well Nourished] : well nourished [No Acute Distress] : no acute distress [Normal Sclera/Conjunctiva] : normal sclera/conjunctiva [PERRL] : pupils equal, round and reactive to light [No Lid Lag] : no lid lag [Normal Hearing] : hearing was normal [Thyroid Not Enlarged] : the thyroid was not enlarged [No Thyroid Nodules] : no palpable thyroid nodules [No Respiratory Distress] : no respiratory distress [No Accessory Muscle Use] : no accessory muscle use [Normal Rate] : heart rate was normal [Soft] : abdomen soft [No CVA Tenderness] : no ~M costovertebral angle tenderness [No Stigmata of Cushings Syndrome] : no stigmata of Cushings Syndrome [Normal Gait] : normal gait [No Rash] : no rash [No Skin Lesions] : no skin lesions [No Motor Deficits] : the motor exam was normal [No Tremors] : no tremors [Oriented x3] : oriented to person, place, and time [Normal Affect] : the affect was normal [Normal Insight/Judgement] : insight and judgment were intact [Normal Mood] : the mood was normal [de-identified] : +mild proptosis

## 2021-06-03 NOTE — DATA REVIEWED
[FreeTextEntry1] : All pertinent labs and imaging reviewed from patient's chart. \par \par March 2021 TSH 20, FT4 1.0 \par May 2021 TSH 44.60, FT4 0.8 \par TRAB negative \par TPO Ab negative \par \par Thyroid USG May 2021: \par INTERPRETATION:  CLINICAL INFORMATION: Hypothyroidism\par COMPARISON: None available.\par TECHNIQUE: Sonography of the thyroid.\par \par FINDINGS:\par Right Lobe: 2.9 cm x  0.7 cm x 0.9 cm. The gland is atrophic and heterogeneous\par Left Lobe: 2.6 cm x 0.5 cm x 0.7 cm. The gland is atrophic and heterogeneous\par Isthmus: 1 mm.\par Cervical Lymph Nodes: No enlarged or abnormal morphology cervical nodes.\par \par IMPRESSION:\par Atrophic heterogeneous thyroid\par

## 2021-06-03 NOTE — HISTORY OF PRESENT ILLNESS
[de-identified] : 47yo F w/ HTN, hypothyroidism here for evaluation of iron deficiency anemia.\par \par Pt reports having chronic anemia which worsened about 5 or more years ago. She was started on iron supplementation about a year ago but was not compliant. She was restarted after PMD visit in March when her Hgb was 7.6, Fe 17, TIBC 510, %sat 3%, ferritin 7. Labs were repeated in May and her Hgb improved slightly to 8.3. She has an appointment with GI on 7/8. She reports having heavy menses. GYN gave a medication to take during menses to reduce flow (does not recall name). LMP was 5/3. It lasts about 5-6 days. First 2-3 days are heavy and needs to change pads about every hour. No fibroids as of last year. \par She reports feeling fatigued and cold. She also noticed bruising recently, mainly on the legs. \par \par She received J&J vaccine on 3/18/21.  \par She recently graduated from nursing school.

## 2021-06-14 ENCOUNTER — APPOINTMENT (OUTPATIENT)
Dept: INFUSION THERAPY | Facility: HOSPITAL | Age: 46
End: 2021-06-14

## 2021-06-14 RX ORDER — ACETAMINOPHEN 500 MG
0 TABLET ORAL
Qty: 0 | Refills: 0 | DISCHARGE

## 2021-06-14 RX ORDER — LEVOTHYROXINE SODIUM 125 MCG
1 TABLET ORAL
Qty: 0 | Refills: 0 | DISCHARGE

## 2021-06-15 DIAGNOSIS — D50.9 IRON DEFICIENCY ANEMIA, UNSPECIFIED: ICD-10-CM

## 2021-06-21 ENCOUNTER — APPOINTMENT (OUTPATIENT)
Dept: INFUSION THERAPY | Facility: HOSPITAL | Age: 46
End: 2021-06-21

## 2021-06-22 ENCOUNTER — NON-APPOINTMENT (OUTPATIENT)
Age: 46
End: 2021-06-22

## 2021-06-28 ENCOUNTER — APPOINTMENT (OUTPATIENT)
Dept: INFUSION THERAPY | Facility: HOSPITAL | Age: 46
End: 2021-06-28

## 2021-07-02 ENCOUNTER — OUTPATIENT (OUTPATIENT)
Dept: OUTPATIENT SERVICES | Facility: HOSPITAL | Age: 46
LOS: 1 days | Discharge: HOME | End: 2021-07-02

## 2021-07-02 DIAGNOSIS — Z98.51 TUBAL LIGATION STATUS: Chronic | ICD-10-CM

## 2021-07-02 DIAGNOSIS — D64.9 ANEMIA, UNSPECIFIED: ICD-10-CM

## 2021-07-02 DIAGNOSIS — Z98.890 OTHER SPECIFIED POSTPROCEDURAL STATES: Chronic | ICD-10-CM

## 2021-07-02 PROBLEM — Z87.440 PERSONAL HISTORY OF URINARY (TRACT) INFECTIONS: Chronic | Status: ACTIVE | Noted: 2021-06-21

## 2021-07-02 PROBLEM — M54.5 LOW BACK PAIN: Chronic | Status: ACTIVE | Noted: 2021-06-21

## 2021-07-02 PROBLEM — S80.02XA CONTUSION OF LEFT KNEE, INITIAL ENCOUNTER: Chronic | Status: ACTIVE | Noted: 2021-06-21

## 2021-07-02 PROBLEM — M22.2X9 PATELLOFEMORAL DISORDERS, UNSPECIFIED KNEE: Chronic | Status: ACTIVE | Noted: 2021-06-21

## 2021-07-06 ENCOUNTER — APPOINTMENT (OUTPATIENT)
Dept: INFUSION THERAPY | Facility: HOSPITAL | Age: 46
End: 2021-07-06

## 2021-07-06 DIAGNOSIS — D50.9 IRON DEFICIENCY ANEMIA, UNSPECIFIED: ICD-10-CM

## 2021-07-08 ENCOUNTER — LABORATORY RESULT (OUTPATIENT)
Age: 46
End: 2021-07-08

## 2021-07-08 ENCOUNTER — APPOINTMENT (OUTPATIENT)
Dept: GASTROENTEROLOGY | Facility: CLINIC | Age: 46
End: 2021-07-08
Payer: COMMERCIAL

## 2021-07-08 VITALS
SYSTOLIC BLOOD PRESSURE: 130 MMHG | WEIGHT: 162 LBS | HEIGHT: 62.5 IN | BODY MASS INDEX: 29.07 KG/M2 | DIASTOLIC BLOOD PRESSURE: 70 MMHG | OXYGEN SATURATION: 99 % | HEART RATE: 74 BPM | TEMPERATURE: 97.7 F

## 2021-07-08 PROCEDURE — 99204 OFFICE O/P NEW MOD 45 MIN: CPT

## 2021-07-08 PROCEDURE — 99072 ADDL SUPL MATRL&STAF TM PHE: CPT

## 2021-07-08 NOTE — HISTORY OF PRESENT ILLNESS
[de-identified] : July 08, 2021 \par \par KYLE HARRIS MD\par \par Pleasant 46-year-old female\par \par Long history of iron deficiency anemia\par \par Heavy menstruation\par \par Under the care of gynecology\par \par Received 3 intravenous iron infusions under care of hematology\par \par Due for screening colonoscopy\par \par Never had upper endoscopy\par \par \par \par Ms. TERI FAIRCHILD 46 year is referred for colon cancer screening.  The patient denies any change in bowel movements, blood per rectum, abdominal, pelvic or rectal discomfort.   \par \par There is no family history of colon cancer or other gastrointestinal cancers.\par \par The patient denies any unexplained weight loss, fever chills or night sweats.\par \par This is the first screening colonoscopy for the patient.\par \par There is no significant cardiac or pulmonary history.\par \par No complaints of chest pain, shortness of breath, palpitations, cough.\par \par The patient is feeling quite well.\par \par The patient is on no significant anticoagulant therapy or anti platelet therapy. \par \par No adverse reaction to anesthesia in the past.\par \par

## 2021-07-08 NOTE — ASSESSMENT
[FreeTextEntry1] : Impression\par \par Iron deficiency anemia\par \par Heavy menstruation\par \par Under care of gynecology\par \par Under care of hematology\par \par Receiving intravenous iron\par \par Due for screening colonoscopy\par \par Request for colon cancer screening\par \par Suggest\par \par Continue follow-up with hematology and iron infusions as needed\par \par Both upper endoscopy and colonoscopy\par \par Follow-up with gynecology\par \par Agree with colonoscopy\par \par Suprep\par \par The laxative, or its risks benefits and alternatives have been thoroughly reviewed with the patient in great detail. The laxative instructions have been reviewed in great detail with the patient.\par \par Risks/benefits:\par The procedure, the risks and benefits and alternatives have been reviewed in great detail with the patient.  Risks including, but not limited to sedation such as cardiac and pulmonary compromise, the procedure itself such as bleeding requiring hospitalization, transfusion, surgery, temporary or permanent colostomy.  Perforation or puncture of the requiring hospitalization, surgery, temporary colostomy.\par It has been explained to the patient that though colonoscopy is thought to be the best screening exam for colon cancer and polyps, no screening exam can find all colon polyps or cancers.  \par The patient expresses understanding of the procedure and consents to undergoing the procedure.\par

## 2021-07-08 NOTE — REASON FOR VISIT
[Initial Evaluation] : an initial evaluation [FreeTextEntry1] : Iron deficiency anemia, heavy menstruation, requiring intravenous iron and seen by hematologist, due for screening colonoscopy

## 2021-07-09 ENCOUNTER — TRANSCRIPTION ENCOUNTER (OUTPATIENT)
Age: 46
End: 2021-07-09

## 2021-07-12 ENCOUNTER — APPOINTMENT (OUTPATIENT)
Dept: ORTHOPEDIC SURGERY | Facility: CLINIC | Age: 46
End: 2021-07-12

## 2021-07-13 ENCOUNTER — APPOINTMENT (OUTPATIENT)
Dept: INFUSION THERAPY | Facility: HOSPITAL | Age: 46
End: 2021-07-13

## 2021-07-20 ENCOUNTER — APPOINTMENT (OUTPATIENT)
Dept: INFUSION THERAPY | Facility: HOSPITAL | Age: 46
End: 2021-07-20

## 2021-07-22 ENCOUNTER — NON-APPOINTMENT (OUTPATIENT)
Age: 46
End: 2021-07-22

## 2021-07-22 ENCOUNTER — APPOINTMENT (OUTPATIENT)
Dept: OPHTHALMOLOGY | Facility: CLINIC | Age: 46
End: 2021-07-22
Payer: COMMERCIAL

## 2021-07-22 PROCEDURE — 99072 ADDL SUPL MATRL&STAF TM PHE: CPT

## 2021-07-22 PROCEDURE — 92004 COMPRE OPH EXAM NEW PT 1/>: CPT

## 2021-07-23 ENCOUNTER — APPOINTMENT (OUTPATIENT)
Dept: ENDOCRINOLOGY | Facility: CLINIC | Age: 46
End: 2021-07-23

## 2021-07-27 ENCOUNTER — APPOINTMENT (OUTPATIENT)
Dept: INFUSION THERAPY | Facility: HOSPITAL | Age: 46
End: 2021-07-27

## 2021-08-02 ENCOUNTER — APPOINTMENT (OUTPATIENT)
Dept: ORTHOPEDIC SURGERY | Facility: CLINIC | Age: 46
End: 2021-08-02

## 2021-08-02 NOTE — PROGRESS NOTE ADULT - SUBJECTIVE AND OBJECTIVE BOX
Patient is a 44y old  Female who presents with a chief complaint of shortness of breath (15 Apr 2020 12:49)      SUBJECTIVE / OVERNIGHT EVENTS:  pt is on NRM.  no complaints.  was seen by ID.    MEDICATIONS  (STANDING):  amLODIPine   Tablet 10 milliGRAM(s) Oral daily  DULoxetine 60 milliGRAM(s) Oral daily  enoxaparin Injectable 40 milliGRAM(s) SubCutaneous every 12 hours  hydrochlorothiazide 50 milliGRAM(s) Oral daily  levothyroxine 137 MICROGram(s) Oral daily  Nephro-rimma 1 Tablet(s) Oral daily    MEDICATIONS  (PRN):  acetaminophen   Tablet .. 650 milliGRAM(s) Oral every 4 hours PRN Temp greater or equal to 38.5C (101.3F)  ALBUTerol    90 MICROgram(s) HFA Inhaler 2 Puff(s) Inhalation every 4 hours PRN Shortness of Breath and/or Wheezing  benzonatate 100 milliGRAM(s) Oral three times a day PRN Cough  guaifenesin/dextromethorphan  Syrup 10 milliLiter(s) Oral every 4 hours PRN Cough  ondansetron Injectable 4 milliGRAM(s) IV Push every 6 hours PRN Nausea and/or Vomiting  sodium chloride 0.65% Nasal 1 Spray(s) Both Nostrils every 4 hours PRN Nasal Congestion      Vital Signs Last 24 Hrs  T(C): 36.9 (15 Apr 2020 03:20), Max: 36.9 (15 Apr 2020 03:20)  T(F): 98.4 (15 Apr 2020 03:20), Max: 98.4 (15 Apr 2020 03:20)  HR: 94 (15 Apr 2020 03:20) (94 - 97)  BP: 127/89 (15 Apr 2020 03:20) (108/75 - 127/89)  BP(mean): --  RR: 18 (15 Apr 2020 03:20) (18 - 18)  SpO2: 94% (15 Apr 2020 03:20) (92% - 94%)  CAPILLARY BLOOD GLUCOSE        I&O's Summary        LABS:                    RADIOLOGY & ADDITIONAL TESTS:    Imaging Personally Reviewed:    Consultant(s) Notes Reviewed:      Care Discussed with Consultants/Other Providers: No

## 2021-08-09 ENCOUNTER — APPOINTMENT (OUTPATIENT)
Dept: ORTHOPEDIC SURGERY | Facility: CLINIC | Age: 46
End: 2021-08-09
Payer: COMMERCIAL

## 2021-08-09 DIAGNOSIS — M50.30 OTHER CERVICAL DISC DEGENERATION, UNSPECIFIED CERVICAL REGION: ICD-10-CM

## 2021-08-09 DIAGNOSIS — M51.36 OTHER INTERVERTEBRAL DISC DEGENERATION, LUMBAR REGION: ICD-10-CM

## 2021-08-09 PROCEDURE — 99214 OFFICE O/P EST MOD 30 MIN: CPT

## 2021-08-09 RX ORDER — IBUPROFEN 800 MG/1
800 TABLET, FILM COATED ORAL 3 TIMES DAILY
Qty: 90 | Refills: 3 | Status: ACTIVE | COMMUNITY
Start: 2021-03-15 | End: 1900-01-01

## 2021-08-31 ENCOUNTER — NON-APPOINTMENT (OUTPATIENT)
Age: 46
End: 2021-08-31

## 2021-09-01 ENCOUNTER — OUTPATIENT (OUTPATIENT)
Dept: OUTPATIENT SERVICES | Facility: HOSPITAL | Age: 46
LOS: 1 days | Discharge: HOME | End: 2021-09-01

## 2021-09-01 DIAGNOSIS — D64.9 ANEMIA, UNSPECIFIED: ICD-10-CM

## 2021-09-01 DIAGNOSIS — Z98.51 TUBAL LIGATION STATUS: Chronic | ICD-10-CM

## 2021-09-01 DIAGNOSIS — Z98.890 OTHER SPECIFIED POSTPROCEDURAL STATES: Chronic | ICD-10-CM

## 2021-09-02 ENCOUNTER — RESULT REVIEW (OUTPATIENT)
Age: 46
End: 2021-09-02

## 2021-09-02 ENCOUNTER — APPOINTMENT (OUTPATIENT)
Dept: HEMATOLOGY ONCOLOGY | Facility: CLINIC | Age: 46
End: 2021-09-02
Payer: COMMERCIAL

## 2021-09-02 VITALS
DIASTOLIC BLOOD PRESSURE: 89 MMHG | TEMPERATURE: 97.3 F | OXYGEN SATURATION: 99 % | SYSTOLIC BLOOD PRESSURE: 127 MMHG | RESPIRATION RATE: 15 BRPM | HEIGHT: 62.5 IN | HEART RATE: 65 BPM | BODY MASS INDEX: 29.27 KG/M2 | WEIGHT: 163.14 LBS

## 2021-09-02 LAB
BASOPHILS # BLD AUTO: 0.07 K/UL — SIGNIFICANT CHANGE UP (ref 0–0.2)
BASOPHILS NFR BLD AUTO: 1.2 % — SIGNIFICANT CHANGE UP (ref 0–2)
EOSINOPHIL # BLD AUTO: 0.26 K/UL — SIGNIFICANT CHANGE UP (ref 0–0.5)
EOSINOPHIL NFR BLD AUTO: 4.4 % — SIGNIFICANT CHANGE UP (ref 0–6)
HCT VFR BLD CALC: 35.9 % — SIGNIFICANT CHANGE UP (ref 34.5–45)
HGB BLD-MCNC: 11.6 G/DL — SIGNIFICANT CHANGE UP (ref 11.5–15.5)
IMM GRANULOCYTES NFR BLD AUTO: 0.3 % — SIGNIFICANT CHANGE UP (ref 0–1.5)
LYMPHOCYTES # BLD AUTO: 1.2 K/UL — SIGNIFICANT CHANGE UP (ref 1–3.3)
LYMPHOCYTES # BLD AUTO: 20.2 % — SIGNIFICANT CHANGE UP (ref 13–44)
MCHC RBC-ENTMCNC: 24.8 PG — LOW (ref 27–34)
MCHC RBC-ENTMCNC: 32.3 G/DL — SIGNIFICANT CHANGE UP (ref 32–36)
MCV RBC AUTO: 76.9 FL — LOW (ref 80–100)
MONOCYTES # BLD AUTO: 0.33 K/UL — SIGNIFICANT CHANGE UP (ref 0–0.9)
MONOCYTES NFR BLD AUTO: 5.6 % — SIGNIFICANT CHANGE UP (ref 2–14)
NEUTROPHILS # BLD AUTO: 4.05 K/UL — SIGNIFICANT CHANGE UP (ref 1.8–7.4)
NEUTROPHILS NFR BLD AUTO: 68.3 % — SIGNIFICANT CHANGE UP (ref 43–77)
NRBC # BLD: 0 /100 WBCS — SIGNIFICANT CHANGE UP (ref 0–0)
PLATELET # BLD AUTO: 282 K/UL — SIGNIFICANT CHANGE UP (ref 150–400)
RBC # BLD: 4.67 M/UL — SIGNIFICANT CHANGE UP (ref 3.8–5.2)
RBC # FLD: 20 % — HIGH (ref 10.3–14.5)
WBC # BLD: 5.93 K/UL — SIGNIFICANT CHANGE UP (ref 3.8–10.5)
WBC # FLD AUTO: 5.93 K/UL — SIGNIFICANT CHANGE UP (ref 3.8–10.5)

## 2021-09-02 PROCEDURE — 99213 OFFICE O/P EST LOW 20 MIN: CPT

## 2021-09-02 NOTE — ASSESSMENT
[FreeTextEntry1] : 45yo F w/ HTN, hypothyroidism here for f/u of iron deficiency anemia.\par s/p Venofer x6, completed 7/27/21\par Hgb today improved to 11.6, f/u iron studies -CBC results reviewed with patient\par Cont oral iron when pt has her menses. We reviewed iron rich foods\par She will f/u w/ GI and GYN as scheduled\par RTC 3-4 mo

## 2021-09-02 NOTE — HISTORY OF PRESENT ILLNESS
[de-identified] : 45yo F w/ HTN, hypothyroidism here for evaluation of iron deficiency anemia.\par \par Pt reports having chronic anemia which worsened about 5 or more years ago. She was started on iron supplementation about a year ago but was not compliant. She was restarted after PMD visit in March when her Hgb was 7.6, Fe 17, TIBC 510, %sat 3%, ferritin 7. Labs were repeated in May and her Hgb improved slightly to 8.3. She has an appointment with GI on 7/8. She reports having heavy menses. GYN gave a medication to take during menses to reduce flow (does not recall name). LMP was 5/3. It lasts about 5-6 days. First 2-3 days are heavy and needs to change pads about every hour. No fibroids as of last year. \par She reports feeling fatigued and cold. She also noticed bruising recently, mainly on the legs. \par \par She received J&J vaccine on 3/18/21.  \par She recently graduated from nursing school. [de-identified] : s/p Venofer x6, completed 7/27/21\par Less fatigued and no longer feeling cold. \par But she continues to have heavy menses.

## 2021-09-03 LAB
FERRITIN SERPL-MCNC: 99 NG/ML
IRON SATN MFR SERPL: 16 %
IRON SERPL-MCNC: 57 UG/DL
TIBC SERPL-MCNC: 361 UG/DL
UIBC SERPL-MCNC: 304 UG/DL

## 2021-09-12 ENCOUNTER — APPOINTMENT (OUTPATIENT)
Dept: GASTROENTEROLOGY | Facility: AMBULATORY MEDICAL SERVICES | Age: 46
End: 2021-09-12
Payer: COMMERCIAL

## 2021-09-12 DIAGNOSIS — K29.00 ACUTE GASTRITIS W/OUT BLEEDING: ICD-10-CM

## 2021-09-12 PROCEDURE — 45378 DIAGNOSTIC COLONOSCOPY: CPT

## 2021-09-12 PROCEDURE — 43239 EGD BIOPSY SINGLE/MULTIPLE: CPT

## 2021-09-28 ENCOUNTER — NON-APPOINTMENT (OUTPATIENT)
Age: 46
End: 2021-09-28

## 2021-11-15 ENCOUNTER — APPOINTMENT (OUTPATIENT)
Dept: ORTHOPEDIC SURGERY | Facility: CLINIC | Age: 46
End: 2021-11-15

## 2021-11-17 ENCOUNTER — TRANSCRIPTION ENCOUNTER (OUTPATIENT)
Age: 46
End: 2021-11-17

## 2021-11-18 ENCOUNTER — NON-APPOINTMENT (OUTPATIENT)
Age: 46
End: 2021-11-18

## 2021-12-03 ENCOUNTER — APPOINTMENT (OUTPATIENT)
Dept: ENDOCRINOLOGY | Facility: CLINIC | Age: 46
End: 2021-12-03
Payer: COMMERCIAL

## 2021-12-03 VITALS
SYSTOLIC BLOOD PRESSURE: 132 MMHG | BODY MASS INDEX: 31.65 KG/M2 | OXYGEN SATURATION: 100 % | DIASTOLIC BLOOD PRESSURE: 89 MMHG | WEIGHT: 172 LBS | HEART RATE: 84 BPM | HEIGHT: 62 IN

## 2021-12-03 PROCEDURE — 99213 OFFICE O/P EST LOW 20 MIN: CPT

## 2021-12-06 ENCOUNTER — TRANSCRIPTION ENCOUNTER (OUTPATIENT)
Age: 46
End: 2021-12-06

## 2021-12-06 LAB
T4 FREE SERPL-MCNC: 1.7 NG/DL
TSH SERPL-ACNC: 1.58 UIU/ML

## 2021-12-06 RX ORDER — LEVOTHYROXINE SODIUM 0.12 MG/1
125 TABLET ORAL
Qty: 90 | Refills: 1 | Status: ACTIVE | COMMUNITY
Start: 2021-07-09 | End: 1900-01-01

## 2021-12-08 ENCOUNTER — OUTPATIENT (OUTPATIENT)
Dept: OUTPATIENT SERVICES | Facility: HOSPITAL | Age: 46
LOS: 1 days | Discharge: HOME | End: 2021-12-08

## 2021-12-08 DIAGNOSIS — Z98.51 TUBAL LIGATION STATUS: Chronic | ICD-10-CM

## 2021-12-08 DIAGNOSIS — D64.9 ANEMIA, UNSPECIFIED: ICD-10-CM

## 2021-12-08 DIAGNOSIS — Z98.890 OTHER SPECIFIED POSTPROCEDURAL STATES: Chronic | ICD-10-CM

## 2021-12-09 ENCOUNTER — APPOINTMENT (OUTPATIENT)
Dept: HEMATOLOGY ONCOLOGY | Facility: CLINIC | Age: 46
End: 2021-12-09

## 2021-12-20 ENCOUNTER — APPOINTMENT (OUTPATIENT)
Dept: UROLOGY | Facility: CLINIC | Age: 46
End: 2021-12-20
Payer: COMMERCIAL

## 2021-12-20 VITALS
OXYGEN SATURATION: 97 % | WEIGHT: 172 LBS | BODY MASS INDEX: 31.65 KG/M2 | DIASTOLIC BLOOD PRESSURE: 105 MMHG | HEART RATE: 80 BPM | SYSTOLIC BLOOD PRESSURE: 153 MMHG | RESPIRATION RATE: 15 BRPM | TEMPERATURE: 97.8 F | HEIGHT: 62 IN

## 2021-12-20 PROCEDURE — 99213 OFFICE O/P EST LOW 20 MIN: CPT

## 2021-12-20 NOTE — HISTORY OF PRESENT ILLNESS
[FreeTextEntry1] : She is a 46-year-old woman who is seen today in followup for UTIs.  She is using cephalexin 250 mg for postcoital prophylaxis with good results.  She has no hematuria or dysuria now.  There is no flank pain.  Culture showed E. coli in .  Residual urine today was minimal.  She usually does not have nocturia.\par \par Urine culture in 2019 was normal and ultrasound showed normal kidneys and bladder. \par \par Previous notes: She was on nitrofurantoin post coital prophylaxis and had not had any recurrent infections. Urine culture in 2018 showed Escherichia coli and in 2018 Klebsiella. She has undergone 2  sections in the past. There is no incontinence. According to the patient, cystoscopy and renal ultrasound were done a few years ago.

## 2021-12-20 NOTE — ASSESSMENT
[FreeTextEntry1] : Residual urine volume is minimal.  She usually does not have significant urinary symptoms.  Urine culture will be submitted.  She will continue with Keflex postcoital prophylaxis.\par \par Adam Groves MD, FACS\par [facility]\par  of Urology\par \par [address], [address]\[place] [address]\par \par [address]\[place] [address]\par \par Tel: [phone]\par Fax: [phone]

## 2021-12-20 NOTE — PHYSICAL EXAM
[General Appearance - Well Developed] : well developed [General Appearance - Well Nourished] : well nourished [Normal Appearance] : normal appearance [Well Groomed] : well groomed [General Appearance - In No Acute Distress] : no acute distress [Abdomen Soft] : soft [Abdomen Tenderness] : non-tender [Costovertebral Angle Tenderness] : no ~M costovertebral angle tenderness [FreeTextEntry1] : Bladder not palpable [] : no respiratory distress [Respiration, Rhythm And Depth] : normal respiratory rhythm and effort [Exaggerated Use Of Accessory Muscles For Inspiration] : no accessory muscle use [Oriented To Time, Place, And Person] : oriented to person, place, and time [Affect] : the affect was normal [Mood] : the mood was normal [Not Anxious] : not anxious

## 2021-12-27 ENCOUNTER — RX RENEWAL (OUTPATIENT)
Age: 46
End: 2021-12-27

## 2021-12-27 LAB — BACTERIA UR CULT: ABNORMAL

## 2022-02-16 ENCOUNTER — RX RENEWAL (OUTPATIENT)
Age: 47
End: 2022-02-16

## 2022-03-31 ENCOUNTER — NON-APPOINTMENT (OUTPATIENT)
Age: 47
End: 2022-03-31

## 2022-03-31 ENCOUNTER — APPOINTMENT (OUTPATIENT)
Dept: INTERNAL MEDICINE | Facility: CLINIC | Age: 47
End: 2022-03-31
Payer: COMMERCIAL

## 2022-03-31 VITALS
HEIGHT: 62 IN | RESPIRATION RATE: 17 BRPM | BODY MASS INDEX: 30.91 KG/M2 | DIASTOLIC BLOOD PRESSURE: 82 MMHG | TEMPERATURE: 98.1 F | OXYGEN SATURATION: 100 % | HEART RATE: 83 BPM | SYSTOLIC BLOOD PRESSURE: 131 MMHG | WEIGHT: 168 LBS

## 2022-03-31 DIAGNOSIS — N39.0 URINARY TRACT INFECTION, SITE NOT SPECIFIED: ICD-10-CM

## 2022-03-31 DIAGNOSIS — Z12.4 ENCOUNTER FOR SCREENING FOR MALIGNANT NEOPLASM OF CERVIX: ICD-10-CM

## 2022-03-31 DIAGNOSIS — I10 ESSENTIAL (PRIMARY) HYPERTENSION: ICD-10-CM

## 2022-03-31 DIAGNOSIS — R14.0 ABDOMINAL DISTENSION (GASEOUS): ICD-10-CM

## 2022-03-31 DIAGNOSIS — E03.9 HYPOTHYROIDISM, UNSPECIFIED: ICD-10-CM

## 2022-03-31 DIAGNOSIS — G43.709 CHRONIC MIGRAINE W/OUT AURA, NOT INTRACTABLE, W/OUT STATUS MIGRAINOSUS: ICD-10-CM

## 2022-03-31 DIAGNOSIS — Z12.11 ENCOUNTER FOR SCREENING FOR MALIGNANT NEOPLASM OF COLON: ICD-10-CM

## 2022-03-31 DIAGNOSIS — D75.839 THROMBOCYTOSIS, UNSPECIFIED: ICD-10-CM

## 2022-03-31 DIAGNOSIS — Z00.00 ENCOUNTER FOR GENERAL ADULT MEDICAL EXAMINATION W/OUT ABNORMAL FINDINGS: ICD-10-CM

## 2022-03-31 DIAGNOSIS — Z12.83 ENCOUNTER FOR SCREENING FOR MALIGNANT NEOPLASM OF SKIN: ICD-10-CM

## 2022-03-31 DIAGNOSIS — E66.9 OBESITY, UNSPECIFIED: ICD-10-CM

## 2022-03-31 DIAGNOSIS — E87.6 HYPOKALEMIA: ICD-10-CM

## 2022-03-31 DIAGNOSIS — Z12.31 ENCOUNTER FOR SCREENING MAMMOGRAM FOR MALIGNANT NEOPLASM OF BREAST: ICD-10-CM

## 2022-03-31 DIAGNOSIS — R92.8 OTHER ABNORMAL AND INCONCLUSIVE FINDINGS ON DIAGNOSTIC IMAGING OF BREAST: ICD-10-CM

## 2022-03-31 PROCEDURE — 99213 OFFICE O/P EST LOW 20 MIN: CPT | Mod: 25

## 2022-03-31 PROCEDURE — 99396 PREV VISIT EST AGE 40-64: CPT | Mod: 25

## 2022-03-31 PROCEDURE — 93000 ELECTROCARDIOGRAM COMPLETE: CPT

## 2022-03-31 RX ORDER — DULOXETINE HYDROCHLORIDE 60 MG/1
60 CAPSULE, DELAYED RELEASE PELLETS ORAL
Qty: 30 | Refills: 0 | Status: DISCONTINUED | COMMUNITY
Start: 2018-10-05 | End: 2022-03-31

## 2022-03-31 RX ORDER — SODIUM SULFATE, POTASSIUM SULFATE, MAGNESIUM SULFATE 17.5; 3.13; 1.6 G/ML; G/ML; G/ML
17.5-3.13-1.6 SOLUTION, CONCENTRATE ORAL
Qty: 1 | Refills: 0 | Status: DISCONTINUED | COMMUNITY
Start: 2021-07-08 | End: 2022-03-31

## 2022-03-31 RX ORDER — CEPHALEXIN 250 MG/1
250 CAPSULE ORAL
Qty: 90 | Refills: 1 | Status: DISCONTINUED | COMMUNITY
Start: 2019-01-15 | End: 2022-03-31

## 2022-03-31 RX ORDER — METOCLOPRAMIDE 10 MG/1
10 TABLET ORAL
Qty: 1 | Refills: 0 | Status: DISCONTINUED | COMMUNITY
Start: 2021-09-12 | End: 2022-03-31

## 2022-04-01 DIAGNOSIS — E55.9 VITAMIN D DEFICIENCY, UNSPECIFIED: ICD-10-CM

## 2022-04-01 LAB
25(OH)D3 SERPL-MCNC: 13.6 NG/ML
ALBUMIN SERPL ELPH-MCNC: 4.2 G/DL
ALP BLD-CCNC: 60 U/L
ALT SERPL-CCNC: 16 U/L
ANION GAP SERPL CALC-SCNC: 10 MMOL/L
AST SERPL-CCNC: 23 U/L
BASOPHILS # BLD AUTO: 0.06 K/UL
BASOPHILS NFR BLD AUTO: 1 %
BILIRUB SERPL-MCNC: 0.3 MG/DL
BUN SERPL-MCNC: 13 MG/DL
CALCIUM SERPL-MCNC: 9.1 MG/DL
CHLORIDE SERPL-SCNC: 106 MMOL/L
CHOLEST SERPL-MCNC: 199 MG/DL
CO2 SERPL-SCNC: 25 MMOL/L
CREAT SERPL-MCNC: 0.62 MG/DL
EGFR: 111 ML/MIN/1.73M2
EOSINOPHIL # BLD AUTO: 0.17 K/UL
EOSINOPHIL NFR BLD AUTO: 2.7 %
ESTIMATED AVERAGE GLUCOSE: 103 MG/DL
FERRITIN SERPL-MCNC: 6 NG/ML
FOLATE SERPL-MCNC: 11.2 NG/ML
GLUCOSE SERPL-MCNC: 86 MG/DL
HBA1C MFR BLD HPLC: 5.2 %
HCT VFR BLD CALC: 31 %
HDLC SERPL-MCNC: 67 MG/DL
HGB BLD-MCNC: 9.4 G/DL
IMM GRANULOCYTES NFR BLD AUTO: 0.2 %
IRON SATN MFR SERPL: 4 %
IRON SERPL-MCNC: 20 UG/DL
LDLC SERPL CALC-MCNC: 112 MG/DL
LYMPHOCYTES # BLD AUTO: 1.2 K/UL
LYMPHOCYTES NFR BLD AUTO: 19.4 %
MAN DIFF?: NORMAL
MCHC RBC-ENTMCNC: 21.9 PG
MCHC RBC-ENTMCNC: 30.3 GM/DL
MCV RBC AUTO: 72.3 FL
MONOCYTES # BLD AUTO: 0.32 K/UL
MONOCYTES NFR BLD AUTO: 5.2 %
NEUTROPHILS # BLD AUTO: 4.44 K/UL
NEUTROPHILS NFR BLD AUTO: 71.5 %
NONHDLC SERPL-MCNC: 132 MG/DL
PLATELET # BLD AUTO: 307 K/UL
POTASSIUM SERPL-SCNC: 3.4 MMOL/L
PROT SERPL-MCNC: 7.2 G/DL
RBC # BLD: 4.29 M/UL
RBC # FLD: 16.5 %
SODIUM SERPL-SCNC: 142 MMOL/L
T4 FREE SERPL-MCNC: 1.3 NG/DL
TIBC SERPL-MCNC: 474 UG/DL
TRIGL SERPL-MCNC: 102 MG/DL
TSH SERPL-ACNC: 5.67 UIU/ML
UIBC SERPL-MCNC: 454 UG/DL
VIT B12 SERPL-MCNC: 616 PG/ML
WBC # FLD AUTO: 6.2 K/UL

## 2022-04-01 RX ORDER — ERGOCALCIFEROL 1.25 MG/1
1.25 MG CAPSULE, LIQUID FILLED ORAL
Qty: 12 | Refills: 0 | Status: ACTIVE | COMMUNITY
Start: 2022-04-01 | End: 1900-01-01

## 2022-04-10 ENCOUNTER — OUTPATIENT (OUTPATIENT)
Dept: OUTPATIENT SERVICES | Facility: HOSPITAL | Age: 47
LOS: 1 days | Discharge: HOME | End: 2022-04-10

## 2022-04-10 DIAGNOSIS — Z98.890 OTHER SPECIFIED POSTPROCEDURAL STATES: Chronic | ICD-10-CM

## 2022-04-10 DIAGNOSIS — D64.9 ANEMIA, UNSPECIFIED: ICD-10-CM

## 2022-04-10 DIAGNOSIS — Z98.51 TUBAL LIGATION STATUS: Chronic | ICD-10-CM

## 2022-04-12 ENCOUNTER — APPOINTMENT (OUTPATIENT)
Dept: DERMATOLOGY | Facility: CLINIC | Age: 47
End: 2022-04-12
Payer: COMMERCIAL

## 2022-04-12 VITALS — WEIGHT: 168 LBS | BODY MASS INDEX: 30.91 KG/M2 | HEIGHT: 62 IN

## 2022-04-12 DIAGNOSIS — Z12.83 ENCOUNTER FOR SCREENING FOR MALIGNANT NEOPLASM OF SKIN: ICD-10-CM

## 2022-04-12 DIAGNOSIS — L82.1 OTHER SEBORRHEIC KERATOSIS: ICD-10-CM

## 2022-04-12 DIAGNOSIS — L30.9 DERMATITIS, UNSPECIFIED: ICD-10-CM

## 2022-04-12 PROCEDURE — 99203 OFFICE O/P NEW LOW 30 MIN: CPT

## 2022-04-12 RX ORDER — BETAMETHASONE DIPROPIONATE 0.5 MG/G
0.05 OINTMENT, AUGMENTED TOPICAL
Qty: 1 | Refills: 2 | Status: ACTIVE | COMMUNITY
Start: 2022-04-12 | End: 1900-01-01

## 2022-04-13 ENCOUNTER — APPOINTMENT (OUTPATIENT)
Dept: HEMATOLOGY ONCOLOGY | Facility: CLINIC | Age: 47
End: 2022-04-13
Payer: COMMERCIAL

## 2022-04-13 ENCOUNTER — RESULT REVIEW (OUTPATIENT)
Age: 47
End: 2022-04-13

## 2022-04-13 VITALS
RESPIRATION RATE: 16 BRPM | OXYGEN SATURATION: 99 % | TEMPERATURE: 97.2 F | WEIGHT: 169.96 LBS | BODY MASS INDEX: 31.09 KG/M2 | HEART RATE: 75 BPM | DIASTOLIC BLOOD PRESSURE: 87 MMHG | SYSTOLIC BLOOD PRESSURE: 118 MMHG

## 2022-04-13 LAB
BASOPHILS # BLD AUTO: 0.1 K/UL — SIGNIFICANT CHANGE UP (ref 0–0.2)
BASOPHILS NFR BLD AUTO: 1.2 % — SIGNIFICANT CHANGE UP (ref 0–2)
EOSINOPHIL # BLD AUTO: 0.2 K/UL — SIGNIFICANT CHANGE UP (ref 0–0.5)
EOSINOPHIL NFR BLD AUTO: 2.5 % — SIGNIFICANT CHANGE UP (ref 0–6)
HCT VFR BLD CALC: 31.6 % — LOW (ref 34.5–45)
HGB BLD-MCNC: 9.6 G/DL — LOW (ref 11.5–15.5)
IMM GRANULOCYTES NFR BLD AUTO: 0.2 % — SIGNIFICANT CHANGE UP (ref 0–1.5)
LYMPHOCYTES # BLD AUTO: 1.36 K/UL — SIGNIFICANT CHANGE UP (ref 1–3.3)
LYMPHOCYTES # BLD AUTO: 16.8 % — SIGNIFICANT CHANGE UP (ref 13–44)
MCHC RBC-ENTMCNC: 21.9 PG — LOW (ref 27–34)
MCHC RBC-ENTMCNC: 30.4 G/DL — LOW (ref 32–36)
MCV RBC AUTO: 72.1 FL — LOW (ref 80–100)
MONOCYTES # BLD AUTO: 0.65 K/UL — SIGNIFICANT CHANGE UP (ref 0–0.9)
MONOCYTES NFR BLD AUTO: 8 % — SIGNIFICANT CHANGE UP (ref 2–14)
NEUTROPHILS # BLD AUTO: 5.78 K/UL — SIGNIFICANT CHANGE UP (ref 1.8–7.4)
NEUTROPHILS NFR BLD AUTO: 71.3 % — SIGNIFICANT CHANGE UP (ref 43–77)
NRBC # BLD: 0 /100 WBCS — SIGNIFICANT CHANGE UP (ref 0–0)
PLATELET # BLD AUTO: 296 K/UL — SIGNIFICANT CHANGE UP (ref 150–400)
RBC # BLD: 4.38 M/UL — SIGNIFICANT CHANGE UP (ref 3.8–5.2)
RBC # FLD: 16.8 % — HIGH (ref 10.3–14.5)
WBC # BLD: 8.11 K/UL — SIGNIFICANT CHANGE UP (ref 3.8–10.5)
WBC # FLD AUTO: 8.11 K/UL — SIGNIFICANT CHANGE UP (ref 3.8–10.5)

## 2022-04-13 PROCEDURE — 99214 OFFICE O/P EST MOD 30 MIN: CPT

## 2022-04-15 LAB
FERRITIN SERPL-MCNC: 6 NG/ML
IRON SATN MFR SERPL: 5 %
IRON SERPL-MCNC: 21 UG/DL
TIBC SERPL-MCNC: 463 UG/DL
UIBC SERPL-MCNC: 441 UG/DL

## 2022-04-15 NOTE — HISTORY OF PRESENT ILLNESS
[de-identified] : 45yo F w/ HTN, hypothyroidism here for evaluation of iron deficiency anemia.\par \par Pt reports having chronic anemia which worsened about 5 or more years ago. She was started on iron supplementation about a year ago but was not compliant. She was restarted after PMD visit in March when her Hgb was 7.6, Fe 17, TIBC 510, %sat 3%, ferritin 7. Labs were repeated in May and her Hgb improved slightly to 8.3. She has an appointment with GI on 7/8. She reports having heavy menses. GYN gave a medication to take during menses to reduce flow (does not recall name). LMP was 5/3. It lasts about 5-6 days. First 2-3 days are heavy and needs to change pads about every hour. No fibroids as of last year. \par She reports feeling fatigued and cold. She also noticed bruising recently, mainly on the legs. \par \par She received J&J vaccine on 3/18/21.  \par She recently graduated from nursing school. [de-identified] : s/p Venofer x6, completed 7/27/21\par \par But she continues to have heavy menses. \par Started feeling fatigued and cold again about a month ago.

## 2022-04-15 NOTE — ASSESSMENT
[FreeTextEntry1] : 45yo F w/ HTN, hypothyroidism here for f/u of iron deficiency anemia.\par s/p Venofer x6, completed 7/27/21\par Pt once again is iron deficient, Hgb 9.6 -CBC results reviewed with patient\par We will schedule IV Venofer x6 again, pt in agreement\par She will f/u w/ GI and GYN as scheduled\par RTC 4-6 mo

## 2022-04-18 ENCOUNTER — APPOINTMENT (OUTPATIENT)
Dept: INFUSION THERAPY | Facility: HOSPITAL | Age: 47
End: 2022-04-18

## 2022-04-19 ENCOUNTER — APPOINTMENT (OUTPATIENT)
Dept: OPHTHALMOLOGY | Facility: CLINIC | Age: 47
End: 2022-04-19
Payer: COMMERCIAL

## 2022-04-19 ENCOUNTER — NON-APPOINTMENT (OUTPATIENT)
Age: 47
End: 2022-04-19

## 2022-04-19 PROCEDURE — 92012 INTRM OPH EXAM EST PATIENT: CPT

## 2022-04-20 ENCOUNTER — NON-APPOINTMENT (OUTPATIENT)
Age: 47
End: 2022-04-20

## 2022-04-24 ENCOUNTER — RX RENEWAL (OUTPATIENT)
Age: 47
End: 2022-04-24

## 2022-04-24 ENCOUNTER — NON-APPOINTMENT (OUTPATIENT)
Age: 47
End: 2022-04-24

## 2022-04-24 RX ORDER — FAMOTIDINE 40 MG/1
40 TABLET, FILM COATED ORAL AT BEDTIME
Qty: 30 | Refills: 2 | Status: ACTIVE | COMMUNITY
Start: 2021-09-12 | End: 1900-01-01

## 2022-04-25 ENCOUNTER — APPOINTMENT (OUTPATIENT)
Dept: INFUSION THERAPY | Facility: HOSPITAL | Age: 47
End: 2022-04-25

## 2022-04-25 DIAGNOSIS — D50.9 IRON DEFICIENCY ANEMIA, UNSPECIFIED: ICD-10-CM

## 2022-05-02 ENCOUNTER — APPOINTMENT (OUTPATIENT)
Dept: INFUSION THERAPY | Facility: HOSPITAL | Age: 47
End: 2022-05-02

## 2022-05-06 ENCOUNTER — RX RENEWAL (OUTPATIENT)
Age: 47
End: 2022-05-06

## 2022-05-09 ENCOUNTER — APPOINTMENT (OUTPATIENT)
Dept: INFUSION THERAPY | Facility: HOSPITAL | Age: 47
End: 2022-05-09

## 2022-05-09 RX ORDER — DULOXETINE HYDROCHLORIDE 30 MG/1
1 CAPSULE, DELAYED RELEASE ORAL
Qty: 0 | Refills: 0 | DISCHARGE

## 2022-05-09 RX ORDER — AMLODIPINE BESYLATE 2.5 MG/1
1 TABLET ORAL
Qty: 0 | Refills: 0 | DISCHARGE

## 2022-05-12 ENCOUNTER — OUTPATIENT (OUTPATIENT)
Dept: OUTPATIENT SERVICES | Facility: HOSPITAL | Age: 47
LOS: 1 days | Discharge: HOME | End: 2022-05-12

## 2022-05-12 DIAGNOSIS — Z98.890 OTHER SPECIFIED POSTPROCEDURAL STATES: Chronic | ICD-10-CM

## 2022-05-12 DIAGNOSIS — Z98.51 TUBAL LIGATION STATUS: Chronic | ICD-10-CM

## 2022-05-12 DIAGNOSIS — D64.9 ANEMIA, UNSPECIFIED: ICD-10-CM

## 2022-05-18 ENCOUNTER — APPOINTMENT (OUTPATIENT)
Dept: INFUSION THERAPY | Facility: HOSPITAL | Age: 47
End: 2022-05-18

## 2022-05-18 DIAGNOSIS — D50.9 IRON DEFICIENCY ANEMIA, UNSPECIFIED: ICD-10-CM

## 2022-05-23 ENCOUNTER — APPOINTMENT (OUTPATIENT)
Dept: INFUSION THERAPY | Facility: HOSPITAL | Age: 47
End: 2022-05-23

## 2022-06-03 ENCOUNTER — APPOINTMENT (OUTPATIENT)
Dept: INFUSION THERAPY | Facility: HOSPITAL | Age: 47
End: 2022-06-03

## 2022-06-06 NOTE — PRE-OP CHECKLIST - NS PREOP CHK MONITOR ANESTHESIA CONSENT
Completed chart review and discussed with team.     Patient lives in [place] with parents and younger sister. He has [facility]. PCP is Lanny East. Peds Pulmonary has been consulted inpatient. No needs at this time. Mother has been at bedside active in care and updated on plan. Patient likely ready to discharge this afternoon to parents.    done

## 2022-07-19 ENCOUNTER — APPOINTMENT (OUTPATIENT)
Dept: OPHTHALMOLOGY | Facility: CLINIC | Age: 47
End: 2022-07-19

## 2022-07-30 ENCOUNTER — NON-APPOINTMENT (OUTPATIENT)
Age: 47
End: 2022-07-30

## 2022-08-03 ENCOUNTER — RESULT REVIEW (OUTPATIENT)
Age: 47
End: 2022-08-03

## 2022-09-19 ENCOUNTER — APPOINTMENT (OUTPATIENT)
Dept: PAIN MANAGEMENT | Facility: CLINIC | Age: 47
End: 2022-09-19

## 2022-09-19 VITALS
DIASTOLIC BLOOD PRESSURE: 111 MMHG | SYSTOLIC BLOOD PRESSURE: 155 MMHG | HEIGHT: 62 IN | HEART RATE: 73 BPM | WEIGHT: 163 LBS | TEMPERATURE: 98 F | BODY MASS INDEX: 30 KG/M2

## 2022-09-19 PROCEDURE — 99204 OFFICE O/P NEW MOD 45 MIN: CPT

## 2022-09-19 RX ORDER — RIMEGEPANT SULFATE 75 MG/75MG
75 TABLET, ORALLY DISINTEGRATING ORAL
Refills: 0 | Status: DISCONTINUED | COMMUNITY
End: 2022-09-19

## 2022-09-19 NOTE — HISTORY OF PRESENT ILLNESS
[FreeTextEntry1] : Pt has had migraine since age 13. Had been under the care of a Neurologist but he moved . At one point was on Imtrex to abort migraine - po - caused nausea and did try IM . \par Had been on Topamax in the past as well .\par Now on Nurtec , at first it was very helpful , but now not as much .\par Migraines occurs 2-3x/ week . + n/v , sensitive to light and sound . \par Denies aura. 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 \par \par Last MRI brain 10 + years ago- reported to be normal as per pt. \par \par  [Headache] : headache [Aura] : no aura [Nausea] : nausea [Vomiting] : Vomiting [Photophobia] : photophobia [Phonophobia] : phonophobia [Scotoma] : no scotoma [Tingling] : no tingling [Weakness] : no weakness [Scalp Tenderness] : no scalp tenderness

## 2022-09-19 NOTE — PHYSICAL EXAM
[General Appearance - Alert] : alert [General Appearance - Well-Appearing] : healthy appearing [Oriented To Time, Place, And Person] : oriented to person, place, and time [Affect] : the affect was normal [Mood] : the mood was normal [Cranial Nerves Facial (VII)] : face symmetrical [Cranial Nerves Accessory (XI - Cranial And Spinal)] : head turning and shoulder shrug symmetric [Cranial Nerves Hypoglossal (XII)] : there was no tongue deviation with protrusion [Motor Strength] : muscle strength was normal in all four extremities [Paresis Pronator Drift Right-Sided] : no pronator drift on the right [Paresis Pronator Drift Left-Sided] : no pronator drift on the left [Motor Strength Upper Extremities Bilaterally] : strength was normal in both upper extremities [Motor Strength Lower Extremities Bilaterally] : strength was normal in both lower extremities [Coordination - Dysmetria Impaired Finger-to-Nose Bilateral] : not present [2+] : Brachioradialis left 2+ [Sclera] : the sclera and conjunctiva were normal [PERRL With Normal Accommodation] : pupils were equal in size, round, reactive to light, with normal accommodation [Extraocular Movements] : extraocular movements were intact [] : no respiratory distress [Edema] : there was no peripheral edema [Abnormal Walk] : normal gait

## 2022-09-19 NOTE — ASSESSMENT
[FreeTextEntry1] : Pt did not take BP meds yet - I advised she take them ASAP ! SHe verbalized understanding \par Stop Nurtec , trial of Ubrelvy prn - consider Emgality . \par RTO 8 weeks \par \par \par Dr Espinosa on site , billedincident to service.

## 2022-09-22 RX ORDER — UBROGEPANT 100 MG/1
100 TABLET ORAL
Qty: 2 | Refills: 5 | Status: ACTIVE | COMMUNITY
Start: 2022-09-19 | End: 1900-01-01

## 2022-10-27 ENCOUNTER — APPOINTMENT (OUTPATIENT)
Dept: MAMMOGRAPHY | Facility: CLINIC | Age: 47
End: 2022-10-27

## 2022-10-27 ENCOUNTER — OUTPATIENT (OUTPATIENT)
Dept: OUTPATIENT SERVICES | Facility: HOSPITAL | Age: 47
LOS: 1 days | End: 2022-10-27
Payer: COMMERCIAL

## 2022-10-27 ENCOUNTER — APPOINTMENT (OUTPATIENT)
Dept: ULTRASOUND IMAGING | Facility: CLINIC | Age: 47
End: 2022-10-27

## 2022-10-27 DIAGNOSIS — Z12.31 ENCOUNTER FOR SCREENING MAMMOGRAM FOR MALIGNANT NEOPLASM OF BREAST: ICD-10-CM

## 2022-10-27 DIAGNOSIS — Z00.8 ENCOUNTER FOR OTHER GENERAL EXAMINATION: ICD-10-CM

## 2022-10-27 DIAGNOSIS — Z98.51 TUBAL LIGATION STATUS: Chronic | ICD-10-CM

## 2022-10-27 DIAGNOSIS — R92.2 INCONCLUSIVE MAMMOGRAM: ICD-10-CM

## 2022-10-27 DIAGNOSIS — Z98.890 OTHER SPECIFIED POSTPROCEDURAL STATES: Chronic | ICD-10-CM

## 2022-10-27 PROCEDURE — 77067 SCR MAMMO BI INCL CAD: CPT | Mod: 26

## 2022-10-27 PROCEDURE — 77063 BREAST TOMOSYNTHESIS BI: CPT

## 2022-10-27 PROCEDURE — 77063 BREAST TOMOSYNTHESIS BI: CPT | Mod: 26

## 2022-10-27 PROCEDURE — 77067 SCR MAMMO BI INCL CAD: CPT

## 2022-10-27 PROCEDURE — 76641 ULTRASOUND BREAST COMPLETE: CPT | Mod: 26,50

## 2022-10-27 PROCEDURE — 76641 ULTRASOUND BREAST COMPLETE: CPT

## 2022-11-01 ENCOUNTER — OUTPATIENT (OUTPATIENT)
Dept: OUTPATIENT SERVICES | Facility: HOSPITAL | Age: 47
LOS: 1 days | Discharge: HOME | End: 2022-11-01

## 2022-11-01 DIAGNOSIS — Z98.51 TUBAL LIGATION STATUS: Chronic | ICD-10-CM

## 2022-11-01 DIAGNOSIS — Z98.890 OTHER SPECIFIED POSTPROCEDURAL STATES: Chronic | ICD-10-CM

## 2022-11-01 DIAGNOSIS — D64.9 ANEMIA, UNSPECIFIED: ICD-10-CM

## 2022-11-04 ENCOUNTER — APPOINTMENT (OUTPATIENT)
Dept: PAIN MANAGEMENT | Facility: CLINIC | Age: 47
End: 2022-11-04

## 2022-11-04 VITALS
SYSTOLIC BLOOD PRESSURE: 142 MMHG | DIASTOLIC BLOOD PRESSURE: 104 MMHG | WEIGHT: 163 LBS | BODY MASS INDEX: 30 KG/M2 | RESPIRATION RATE: 16 BRPM | HEIGHT: 62 IN | HEART RATE: 79 BPM

## 2022-11-04 PROCEDURE — 99214 OFFICE O/P EST MOD 30 MIN: CPT

## 2022-11-04 NOTE — ASSESSMENT
[FreeTextEntry1] : try re - doing Ubrelvy \par Home Sleep Study \par add Co q 10 \par \par Pt did not take BP meds today. She will take meds when she gets home. \par RTO 3 months

## 2022-11-04 NOTE — HISTORY OF PRESENT ILLNESS
[FreeTextEntry1] : Pt returns today for a follow up appt . \par Has tried Ubrelvy several times and it has been " ok " .\par Overall health has been good . Pt denies any new symptoms. \par Pt has not tried  second dose of Ubrelvy . \par \par Does report the worst migraines are upon waking . \par Pt does work night s and has an irregular sleep schedule. [Headache] : headache [Aura] : no aura [Nausea] : nausea [Vomiting] : Vomiting [Photophobia] : photophobia [Phonophobia] : phonophobia [Scotoma] : no scotoma [Tingling] : no tingling [Weakness] : no weakness [Scalp Tenderness] : no scalp tenderness [___ Times Per Month] : [unfilled] times per month

## 2022-11-09 ENCOUNTER — RESULT REVIEW (OUTPATIENT)
Age: 47
End: 2022-11-09

## 2022-11-09 ENCOUNTER — APPOINTMENT (OUTPATIENT)
Dept: HEMATOLOGY ONCOLOGY | Facility: CLINIC | Age: 47
End: 2022-11-09

## 2022-11-09 VITALS
BODY MASS INDEX: 30.61 KG/M2 | OXYGEN SATURATION: 97 % | RESPIRATION RATE: 16 BRPM | SYSTOLIC BLOOD PRESSURE: 136 MMHG | TEMPERATURE: 97.7 F | WEIGHT: 167.33 LBS | DIASTOLIC BLOOD PRESSURE: 97 MMHG | HEART RATE: 78 BPM

## 2022-11-09 DIAGNOSIS — D50.9 IRON DEFICIENCY ANEMIA, UNSPECIFIED: ICD-10-CM

## 2022-11-09 LAB
BASOPHILS # BLD AUTO: 0.09 K/UL — SIGNIFICANT CHANGE UP (ref 0–0.2)
BASOPHILS NFR BLD AUTO: 1.2 % — SIGNIFICANT CHANGE UP (ref 0–2)
EOSINOPHIL # BLD AUTO: 0.16 K/UL — SIGNIFICANT CHANGE UP (ref 0–0.5)
EOSINOPHIL NFR BLD AUTO: 2.1 % — SIGNIFICANT CHANGE UP (ref 0–6)
HCT VFR BLD CALC: 32.4 % — LOW (ref 34.5–45)
HGB BLD-MCNC: 10.2 G/DL — LOW (ref 11.5–15.5)
IMM GRANULOCYTES NFR BLD AUTO: 0.3 % — SIGNIFICANT CHANGE UP (ref 0–0.9)
LYMPHOCYTES # BLD AUTO: 1.65 K/UL — SIGNIFICANT CHANGE UP (ref 1–3.3)
LYMPHOCYTES # BLD AUTO: 21.8 % — SIGNIFICANT CHANGE UP (ref 13–44)
MCHC RBC-ENTMCNC: 24.6 PG — LOW (ref 27–34)
MCHC RBC-ENTMCNC: 31.5 G/DL — LOW (ref 32–36)
MCV RBC AUTO: 78.1 FL — LOW (ref 80–100)
MONOCYTES # BLD AUTO: 0.47 K/UL — SIGNIFICANT CHANGE UP (ref 0–0.9)
MONOCYTES NFR BLD AUTO: 6.2 % — SIGNIFICANT CHANGE UP (ref 2–14)
NEUTROPHILS # BLD AUTO: 5.17 K/UL — SIGNIFICANT CHANGE UP (ref 1.8–7.4)
NEUTROPHILS NFR BLD AUTO: 68.4 % — SIGNIFICANT CHANGE UP (ref 43–77)
NRBC # BLD: 0 /100 WBCS — SIGNIFICANT CHANGE UP (ref 0–0)
PLATELET # BLD AUTO: 335 K/UL — SIGNIFICANT CHANGE UP (ref 150–400)
RBC # BLD: 4.15 M/UL — SIGNIFICANT CHANGE UP (ref 3.8–5.2)
RBC # FLD: 15 % — HIGH (ref 10.3–14.5)
WBC # BLD: 7.56 K/UL — SIGNIFICANT CHANGE UP (ref 3.8–10.5)
WBC # FLD AUTO: 7.56 K/UL — SIGNIFICANT CHANGE UP (ref 3.8–10.5)

## 2022-11-09 PROCEDURE — 99214 OFFICE O/P EST MOD 30 MIN: CPT

## 2022-11-11 PROBLEM — D50.9 IRON DEFICIENCY ANEMIA, UNSPECIFIED IRON DEFICIENCY ANEMIA TYPE: Status: ACTIVE | Noted: 2021-03-11

## 2022-11-11 NOTE — HISTORY OF PRESENT ILLNESS
[de-identified] : 45yo F w/ HTN, hypothyroidism here for evaluation of iron deficiency anemia.\par \par Pt reports having chronic anemia which worsened about 5 or more years ago. She was started on iron supplementation about a year ago but was not compliant. She was restarted after PMD visit in March when her Hgb was 7.6, Fe 17, TIBC 510, %sat 3%, ferritin 7. Labs were repeated in May and her Hgb improved slightly to 8.3. She has an appointment with GI on 7/8. She reports having heavy menses. GYN gave a medication to take during menses to reduce flow (does not recall name). LMP was 5/3. It lasts about 5-6 days. First 2-3 days are heavy and needs to change pads about every hour. No fibroids as of last year. \par She reports feeling fatigued and cold. She also noticed bruising recently, mainly on the legs. \par \par She received J&J vaccine on 3/18/21.  \par She recently graduated from nursing school. [de-identified] : s/p Venofer x6, completed 7/27/21.\par She had Venofer x6 again, completing 6/3/22. \par \par But she continues to have heavy menses. \par Started feeling fatigued and cold again about a month ago. \par She will be moving out of [place] at the end of the year

## 2022-11-11 NOTE — ASSESSMENT
[FreeTextEntry1] : 45yo F w/ HTN, hypothyroidism here for f/u of iron deficiency anemia.\par s/p Venofer x6, completed 7/27/21. Had another 6x again this year, completing 6/3/22. \par Pt once again is iron deficient, Hgb 10.2 -CBC results reviewed with patient\par We will schedule IV Venofer x6 again, pt in agreement\par She will f/u w/ GI and GYN as scheduled\par Advised pt to establish care once she moves so they can monitor her CBC and iron levels. Pt verbalized understanding.

## 2022-11-14 ENCOUNTER — APPOINTMENT (OUTPATIENT)
Dept: INFUSION THERAPY | Facility: HOSPITAL | Age: 47
End: 2022-11-14

## 2022-11-14 ENCOUNTER — APPOINTMENT (OUTPATIENT)
Dept: HEMATOLOGY ONCOLOGY | Facility: CLINIC | Age: 47
End: 2022-11-14

## 2022-11-14 LAB
FERRITIN SERPL-MCNC: 8 NG/ML
IRON SATN MFR SERPL: 5 %
IRON SERPL-MCNC: 21 UG/DL
TIBC SERPL-MCNC: 438 UG/DL
UIBC SERPL-MCNC: 417 UG/DL

## 2022-11-21 ENCOUNTER — APPOINTMENT (OUTPATIENT)
Dept: INFUSION THERAPY | Facility: HOSPITAL | Age: 47
End: 2022-11-21

## 2022-12-01 ENCOUNTER — RX RENEWAL (OUTPATIENT)
Age: 47
End: 2022-12-01

## 2022-12-01 RX ORDER — PANTOPRAZOLE 40 MG/1
40 TABLET, DELAYED RELEASE ORAL
Qty: 30 | Refills: 1 | Status: ACTIVE | COMMUNITY
Start: 2021-03-09 | End: 1900-01-01

## 2022-12-07 RX ORDER — AMLODIPINE BESYLATE 5 MG/1
5 TABLET ORAL TWICE DAILY
Qty: 180 | Refills: 0 | Status: ACTIVE | COMMUNITY
Start: 2018-12-04 | End: 1900-01-01

## 2022-12-07 RX ORDER — LEVOTHYROXINE SODIUM 0.15 MG/1
150 TABLET ORAL
Qty: 30 | Refills: 2 | Status: ACTIVE | COMMUNITY
Start: 2018-03-25 | End: 1900-01-01

## 2022-12-21 ENCOUNTER — APPOINTMENT (OUTPATIENT)
Dept: UROLOGY | Facility: CLINIC | Age: 47
End: 2022-12-21

## 2022-12-30 ENCOUNTER — NON-APPOINTMENT (OUTPATIENT)
Age: 47
End: 2022-12-30

## 2023-03-10 ENCOUNTER — APPOINTMENT (OUTPATIENT)
Dept: INTERNAL MEDICINE | Facility: CLINIC | Age: 48
End: 2023-03-10

## 2023-04-11 ENCOUNTER — APPOINTMENT (OUTPATIENT)
Dept: INTERNAL MEDICINE | Facility: CLINIC | Age: 48
End: 2023-04-11

## 2025-05-16 NOTE — ED ADULT NURSE NOTE - CADM POA PRESS ULCER
Large Joint Aspiration/Injection: L subacromial bursa    Date/Time: 5/16/2025 2:30 PM    Performed by: Sharda Mccray PA-C  Authorized by: Sharda Mccray PA-C    Consent Done?:  Yes (Verbal)  Indications:  Pain  Site marked: the procedure site was marked    Timeout: prior to procedure the correct patient, procedure, and site was verified    Prep: patient was prepped and draped in usual sterile fashion    Local anesthesia used?: No    Local anesthetic:  Topical anesthetic and bupivacaine 0.25% without epinephrine  Anesthetic total (ml):  4      Details:  Needle Size:  21 G  Ultrasonic Guidance for needle placement?: No    Approach:  Posterior  Location:  Shoulder  Site:  L subacromial bursa  Medications:  40 mg triamcinolone acetonide 40 mg/mL  Patient tolerance:  Patient tolerated the procedure well with no immediate complications     No